# Patient Record
Sex: FEMALE | Race: WHITE | Employment: UNEMPLOYED | URBAN - NONMETROPOLITAN AREA
[De-identification: names, ages, dates, MRNs, and addresses within clinical notes are randomized per-mention and may not be internally consistent; named-entity substitution may affect disease eponyms.]

---

## 2017-05-16 PROBLEM — R00.2 PALPITATIONS: Status: ACTIVE | Noted: 2017-05-16

## 2017-07-13 PROBLEM — R73.03 PREDIABETES: Status: ACTIVE | Noted: 2017-07-13

## 2019-01-23 PROBLEM — Z80.3 FAMILY HISTORY OF MALIGNANT NEOPLASM OF BREAST: Status: ACTIVE | Noted: 2018-06-20

## 2019-01-23 PROBLEM — M19.90 OSTEOARTHROSIS: Status: ACTIVE | Noted: 2018-06-20

## 2019-01-23 PROBLEM — Z01.419 NORMAL PELVIC EXAM: Status: ACTIVE | Noted: 2019-01-23

## 2020-03-24 PROBLEM — N89.8 VAGINAL DISCHARGE: Status: RESOLVED | Noted: 2019-01-23 | Resolved: 2020-03-24

## 2020-05-22 PROBLEM — H90.3 SENSORINEURAL HEARING LOSS, BILATERAL: Status: ACTIVE | Noted: 2020-05-22

## 2021-03-10 DIAGNOSIS — R71.0 DECREASED HEMOGLOBIN: ICD-10-CM

## 2021-03-10 LAB
FERRITIN: 236.9 NG/ML (ref 15–150)
IRON SATURATION: 20 % (ref 15–50)
IRON: 50 UG/DL (ref 37–145)
TOTAL IRON BINDING CAPACITY: 252 UG/DL (ref 260–445)

## 2022-08-18 PROBLEM — E11.69 DIABETES MELLITUS TYPE 2 IN OBESE (HCC): Status: ACTIVE | Noted: 2022-08-18

## 2022-09-17 PROBLEM — Z01.818 PREOPERATIVE CLEARANCE: Status: RESOLVED | Noted: 2022-08-18 | Resolved: 2022-09-17

## 2023-07-31 ENCOUNTER — TELEMEDICINE (OUTPATIENT)
Dept: FAMILY MEDICINE CLINIC | Age: 58
End: 2023-07-31
Payer: COMMERCIAL

## 2023-07-31 DIAGNOSIS — K21.9 GASTROESOPHAGEAL REFLUX DISEASE, UNSPECIFIED WHETHER ESOPHAGITIS PRESENT: ICD-10-CM

## 2023-07-31 DIAGNOSIS — R10.84 GENERALIZED ABDOMINAL PAIN: Primary | ICD-10-CM

## 2023-07-31 PROCEDURE — 99214 OFFICE O/P EST MOD 30 MIN: CPT

## 2023-07-31 RX ORDER — PANTOPRAZOLE SODIUM 20 MG/1
20 TABLET, DELAYED RELEASE ORAL 2 TIMES DAILY
Qty: 60 TABLET | Refills: 3 | Status: SHIPPED | OUTPATIENT
Start: 2023-07-31 | End: 2023-11-28

## 2023-07-31 ASSESSMENT — ENCOUNTER SYMPTOMS
CONSTIPATION: 0
ABDOMINAL PAIN: 1
RECTAL PAIN: 0
BLOOD IN STOOL: 0
NAUSEA: 1
RESPIRATORY NEGATIVE: 1
ANAL BLEEDING: 0
DIARRHEA: 0
VOMITING: 0
ABDOMINAL DISTENTION: 1

## 2023-07-31 NOTE — PROGRESS NOTES
established patient. Services were provided through a video synchronous discussion virtually to substitute for in-person clinic visit.

## 2023-08-09 RX ORDER — SEMAGLUTIDE 0.68 MG/ML
INJECTION, SOLUTION SUBCUTANEOUS
Qty: 3 ML | Refills: 5 | Status: SHIPPED | OUTPATIENT
Start: 2023-08-09

## 2023-08-30 DIAGNOSIS — R10.84 GENERALIZED ABDOMINAL PAIN: Primary | ICD-10-CM

## 2023-09-01 DIAGNOSIS — I10 ESSENTIAL HYPERTENSION: ICD-10-CM

## 2023-09-01 RX ORDER — AMLODIPINE BESYLATE 5 MG/1
5 TABLET ORAL DAILY
Qty: 30 TABLET | Refills: 2 | Status: SHIPPED | OUTPATIENT
Start: 2023-09-01

## 2023-09-01 RX ORDER — RIVAROXABAN 20 MG/1
TABLET, FILM COATED ORAL
Qty: 30 TABLET | Refills: 2 | Status: SHIPPED | OUTPATIENT
Start: 2023-09-01

## 2023-10-05 ENCOUNTER — OUTSIDE SERVICES (OUTPATIENT)
Dept: SURGERY | Age: 58
End: 2023-10-05
Payer: MEDICARE

## 2023-10-05 DIAGNOSIS — R10.30 LOWER ABDOMINAL PAIN: Primary | ICD-10-CM

## 2023-10-05 PROCEDURE — 99202 OFFICE O/P NEW SF 15 MIN: CPT | Performed by: SURGERY

## 2023-10-24 ENCOUNTER — OFFICE VISIT (OUTPATIENT)
Dept: FAMILY MEDICINE CLINIC | Age: 58
End: 2023-10-24
Payer: MEDICARE

## 2023-10-24 VITALS
SYSTOLIC BLOOD PRESSURE: 120 MMHG | WEIGHT: 239 LBS | HEART RATE: 51 BPM | HEIGHT: 68 IN | OXYGEN SATURATION: 96 % | DIASTOLIC BLOOD PRESSURE: 78 MMHG | BODY MASS INDEX: 36.22 KG/M2

## 2023-10-24 DIAGNOSIS — I10 ESSENTIAL HYPERTENSION: ICD-10-CM

## 2023-10-24 DIAGNOSIS — I48.0 PAROXYSMAL ATRIAL FIBRILLATION (HCC): ICD-10-CM

## 2023-10-24 DIAGNOSIS — E78.2 MIXED HYPERLIPIDEMIA: ICD-10-CM

## 2023-10-24 DIAGNOSIS — R53.83 FATIGUE, UNSPECIFIED TYPE: ICD-10-CM

## 2023-10-24 DIAGNOSIS — F32.0 CURRENT MILD EPISODE OF MAJOR DEPRESSIVE DISORDER WITHOUT PRIOR EPISODE (HCC): ICD-10-CM

## 2023-10-24 DIAGNOSIS — E11.9 TYPE 2 DIABETES MELLITUS WITHOUT COMPLICATION, WITHOUT LONG-TERM CURRENT USE OF INSULIN (HCC): ICD-10-CM

## 2023-10-24 DIAGNOSIS — Z00.00 INITIAL MEDICARE ANNUAL WELLNESS VISIT: Primary | ICD-10-CM

## 2023-10-24 DIAGNOSIS — E03.9 HYPOTHYROIDISM, UNSPECIFIED TYPE: ICD-10-CM

## 2023-10-24 LAB
25(OH)D3 SERPL-MCNC: 34.1 NG/ML
ALBUMIN SERPL-MCNC: 4.6 G/DL (ref 3.4–5)
ALBUMIN/GLOB SERPL: 1.9 {RATIO} (ref 1.1–2.2)
ALP SERPL-CCNC: 136 U/L (ref 40–129)
ALT SERPL-CCNC: 11 U/L (ref 10–40)
ANION GAP SERPL CALCULATED.3IONS-SCNC: 9 MMOL/L (ref 3–16)
AST SERPL-CCNC: 16 U/L (ref 15–37)
BASOPHILS # BLD: 0 K/UL (ref 0–0.2)
BASOPHILS NFR BLD: 0.8 %
BILIRUB SERPL-MCNC: 0.8 MG/DL (ref 0–1)
BUN SERPL-MCNC: 14 MG/DL (ref 7–20)
CALCIUM SERPL-MCNC: 9.3 MG/DL (ref 8.3–10.6)
CHLORIDE SERPL-SCNC: 99 MMOL/L (ref 99–110)
CHOLEST SERPL-MCNC: 144 MG/DL (ref 0–199)
CO2 SERPL-SCNC: 29 MMOL/L (ref 21–32)
CREAT SERPL-MCNC: 1 MG/DL (ref 0.6–1.1)
DEPRECATED RDW RBC AUTO: 13.5 % (ref 12.4–15.4)
EOSINOPHIL # BLD: 0.1 K/UL (ref 0–0.6)
EOSINOPHIL NFR BLD: 2.1 %
FOLATE SERPL-MCNC: 10 NG/ML (ref 4.78–24.2)
GFR SERPLBLD CREATININE-BSD FMLA CKD-EPI: >60 ML/MIN/{1.73_M2}
GLUCOSE SERPL-MCNC: 143 MG/DL (ref 70–99)
HCT VFR BLD AUTO: 39 % (ref 36–48)
HDLC SERPL-MCNC: 33 MG/DL (ref 40–60)
HGB BLD-MCNC: 13 G/DL (ref 12–16)
LDLC SERPL CALC-MCNC: ABNORMAL MG/DL
LYMPHOCYTES # BLD: 1.2 K/UL (ref 1–5.1)
LYMPHOCYTES NFR BLD: 19.2 %
MCH RBC QN AUTO: 29.9 PG (ref 26–34)
MCHC RBC AUTO-ENTMCNC: 33.4 G/DL (ref 31–36)
MCV RBC AUTO: 89.4 FL (ref 80–100)
MONOCYTES # BLD: 0.4 K/UL (ref 0–1.3)
MONOCYTES NFR BLD: 6.9 %
NEUTROPHILS # BLD: 4.3 K/UL (ref 1.7–7.7)
NEUTROPHILS NFR BLD: 71 %
PLATELET # BLD AUTO: 387 K/UL (ref 135–450)
PMV BLD AUTO: 8.5 FL (ref 5–10.5)
POTASSIUM SERPL-SCNC: 4.8 MMOL/L (ref 3.5–5.1)
PROT SERPL-MCNC: 7 G/DL (ref 6.4–8.2)
RBC # BLD AUTO: 4.36 M/UL (ref 4–5.2)
SODIUM SERPL-SCNC: 137 MMOL/L (ref 136–145)
TRIGL SERPL-MCNC: 385 MG/DL (ref 0–150)
VIT B12 SERPL-MCNC: 245 PG/ML (ref 211–911)
VLDLC SERPL CALC-MCNC: ABNORMAL MG/DL
WBC # BLD AUTO: 6 K/UL (ref 4–11)

## 2023-10-24 PROCEDURE — 3051F HG A1C>EQUAL 7.0%<8.0%: CPT

## 2023-10-24 PROCEDURE — 3078F DIAST BP <80 MM HG: CPT

## 2023-10-24 PROCEDURE — 36415 COLL VENOUS BLD VENIPUNCTURE: CPT

## 2023-10-24 PROCEDURE — 3074F SYST BP LT 130 MM HG: CPT

## 2023-10-24 PROCEDURE — G0438 PPPS, INITIAL VISIT: HCPCS

## 2023-10-24 PROCEDURE — 99215 OFFICE O/P EST HI 40 MIN: CPT

## 2023-10-24 RX ORDER — DULOXETINE 40 MG/1
40 CAPSULE, DELAYED RELEASE ORAL 2 TIMES DAILY
Qty: 60 CAPSULE | Refills: 3 | Status: SHIPPED | OUTPATIENT
Start: 2023-10-24

## 2023-10-24 ASSESSMENT — PATIENT HEALTH QUESTIONNAIRE - PHQ9
4. FEELING TIRED OR HAVING LITTLE ENERGY: 3
SUM OF ALL RESPONSES TO PHQ9 QUESTIONS 1 & 2: 6
6. FEELING BAD ABOUT YOURSELF - OR THAT YOU ARE A FAILURE OR HAVE LET YOURSELF OR YOUR FAMILY DOWN: 1
8. MOVING OR SPEAKING SO SLOWLY THAT OTHER PEOPLE COULD HAVE NOTICED. OR THE OPPOSITE, BEING SO FIGETY OR RESTLESS THAT YOU HAVE BEEN MOVING AROUND A LOT MORE THAN USUAL: 1
1. LITTLE INTEREST OR PLEASURE IN DOING THINGS: 3
SUM OF ALL RESPONSES TO PHQ QUESTIONS 1-9: 18
SUM OF ALL RESPONSES TO PHQ QUESTIONS 1-9: 18
2. FEELING DOWN, DEPRESSED OR HOPELESS: 3
7. TROUBLE CONCENTRATING ON THINGS, SUCH AS READING THE NEWSPAPER OR WATCHING TELEVISION: 1
SUM OF ALL RESPONSES TO PHQ QUESTIONS 1-9: 18
9. THOUGHTS THAT YOU WOULD BE BETTER OFF DEAD, OR OF HURTING YOURSELF: 0
5. POOR APPETITE OR OVEREATING: 3
10. IF YOU CHECKED OFF ANY PROBLEMS, HOW DIFFICULT HAVE THESE PROBLEMS MADE IT FOR YOU TO DO YOUR WORK, TAKE CARE OF THINGS AT HOME, OR GET ALONG WITH OTHER PEOPLE: 1
3. TROUBLE FALLING OR STAYING ASLEEP: 3
SUM OF ALL RESPONSES TO PHQ QUESTIONS 1-9: 18

## 2023-10-24 ASSESSMENT — LIFESTYLE VARIABLES
HOW MANY STANDARD DRINKS CONTAINING ALCOHOL DO YOU HAVE ON A TYPICAL DAY: PATIENT DOES NOT DRINK
HOW OFTEN DO YOU HAVE A DRINK CONTAINING ALCOHOL: NEVER

## 2023-10-24 ASSESSMENT — COLUMBIA-SUICIDE SEVERITY RATING SCALE - C-SSRS
5. HAVE YOU STARTED TO WORK OUT OR WORKED OUT THE DETAILS OF HOW TO KILL YOURSELF? DO YOU INTEND TO CARRY OUT THIS PLAN?: NO
1. WITHIN THE PAST MONTH, HAVE YOU WISHED YOU WERE DEAD OR WISHED YOU COULD GO TO SLEEP AND NOT WAKE UP?: NO
7. DID THIS OCCUR IN THE LAST THREE MONTHS: NO
3. HAVE YOU BEEN THINKING ABOUT HOW YOU MIGHT KILL YOURSELF?: NO
4. HAVE YOU HAD THESE THOUGHTS AND HAD SOME INTENTION OF ACTING ON THEM?: NO
6. HAVE YOU EVER DONE ANYTHING, STARTED TO DO ANYTHING, OR PREPARED TO DO ANYTHING TO END YOUR LIFE?: NO
2. HAVE YOU ACTUALLY HAD ANY THOUGHTS OF KILLING YOURSELF?: NO
BASED ON RESPONSES TO C-SSRS QS 1-6, WHAT IS THE PATIENT'S OVERALL RISK RATING FOR SUICIDE: NO RISK

## 2023-10-24 NOTE — PROGRESS NOTES
ordered. -     Comprehensive Metabolic Panel  -     LIPID PANEL  Hypothyroidism, unspecified type  Repeat labs ordered today. Patient not currently on thyroid medication. Could be playing a factor in the patient's fatigue symptoms.  -     Comprehensive Metabolic Panel  -     TSH  -     T4, Free  Recommendations for Preventive Services Due: see orders and patient instructions/AVS.  Recommended screening schedule for the next 5-10 years is provided to the patient in written form: see Patient Instructions/AVS.     No follow-ups on file. Subjective   The following acute and/or chronic problems were also addressed today:  Patient seen today for Medicare annual wellness visit/routine office visit. Care gaps addressed today. Diabetes: Taking metformin 500 mg twice daily, Ozempic    Hypothyroid: not on meds at this time. Hyperlipidemia: Taking Lipitor 80 mg daily. Hypertension/A-fib: Taking metoprolol, Norvasc, spironolactone, bisoprolol. Is on Xarelto. Follows cardiology CNP Rodney Jasso and Dr. Coy Smallwood. Ongoing issues with depression. Still struggling with the loss of her mother in December of last year. Prior medication treatment plan effective but feels like she could use an adjustment if possible today. Patient's complete Health Risk Assessment and screening values have been reviewed and are found in Flowsheets. The following problems were reviewed today and where indicated follow up appointments were made and/or referrals ordered.     Positive Risk Factor Screenings with Interventions:        Depression:  PHQ-2 Score: 6  PHQ-9 Total Score: 6    Interpretation:   1-4 = minimal  5-9 = mild  10-14 = moderate  15-19 = moderately severe  20-27 = severe          General HRA Questions:  Select all that apply: (!) New or Increased Fatigue   Weight and Activity:  Physical Activity: Inactive (10/24/2023)    Exercise Vital Sign     Days of Exercise per Week: 0 days     Minutes of Exercise per Session: 0

## 2023-10-25 DIAGNOSIS — E78.2 MIXED HYPERLIPIDEMIA: Primary | ICD-10-CM

## 2023-10-25 LAB
EST. AVERAGE GLUCOSE BLD GHB EST-MCNC: 148.5 MG/DL
HBA1C MFR BLD: 6.8 %
LDLC SERPL-MCNC: 53 MG/DL
T4 FREE SERPL-MCNC: 1.1 NG/DL (ref 0.9–1.8)
TSH SERPL DL<=0.005 MIU/L-ACNC: 2.76 UIU/ML (ref 0.27–4.2)

## 2023-10-25 RX ORDER — FENOFIBRATE 48 MG/1
48 TABLET, COATED ORAL DAILY
Qty: 30 TABLET | Refills: 3 | Status: SHIPPED | OUTPATIENT
Start: 2023-10-25

## 2023-11-07 DIAGNOSIS — K21.9 GASTROESOPHAGEAL REFLUX DISEASE, UNSPECIFIED WHETHER ESOPHAGITIS PRESENT: ICD-10-CM

## 2023-11-07 RX ORDER — PANTOPRAZOLE SODIUM 20 MG/1
20 TABLET, DELAYED RELEASE ORAL 2 TIMES DAILY
Qty: 180 TABLET | Refills: 0 | Status: SHIPPED | OUTPATIENT
Start: 2023-11-07 | End: 2024-02-05

## 2023-11-21 DIAGNOSIS — I10 ESSENTIAL HYPERTENSION: ICD-10-CM

## 2023-11-21 RX ORDER — LISINOPRIL 40 MG/1
40 TABLET ORAL DAILY
Qty: 90 TABLET | Refills: 3 | Status: SHIPPED | OUTPATIENT
Start: 2023-11-21

## 2023-11-27 DIAGNOSIS — I10 ESSENTIAL HYPERTENSION: ICD-10-CM

## 2023-11-27 RX ORDER — AMLODIPINE BESYLATE 5 MG/1
5 TABLET ORAL DAILY
Qty: 30 TABLET | Refills: 5 | Status: SHIPPED | OUTPATIENT
Start: 2023-11-27

## 2023-12-14 NOTE — PROGRESS NOTES
Outside service performed at [facility] with date of service 10/5/2023. Clinic note located in the media section.

## 2024-01-05 RX ORDER — RIVAROXABAN 20 MG/1
TABLET, FILM COATED ORAL
Qty: 30 TABLET | Refills: 1 | Status: SHIPPED | OUTPATIENT
Start: 2024-01-05

## 2024-01-29 RX ORDER — HYDROXYZINE HYDROCHLORIDE 25 MG/1
25 TABLET, FILM COATED ORAL 2 TIMES DAILY PRN
Qty: 60 TABLET | Refills: 0 | Status: SHIPPED | OUTPATIENT
Start: 2024-01-29

## 2024-01-30 ENCOUNTER — TELEPHONE (OUTPATIENT)
Dept: FAMILY MEDICINE CLINIC | Age: 59
End: 2024-01-30

## 2024-01-30 DIAGNOSIS — F41.9 ANXIETY: Primary | ICD-10-CM

## 2024-01-30 RX ORDER — LORAZEPAM 1 MG/1
1 TABLET ORAL EVERY 8 HOURS PRN
Qty: 1 TABLET | Refills: 0 | Status: SHIPPED | OUTPATIENT
Start: 2024-01-30 | End: 2024-02-29

## 2024-01-30 NOTE — TELEPHONE ENCOUNTER
Has a MRI Thursday and was wondering if you would give her a couple pills to get her through the MRI. Due to her anxiety.

## 2024-02-01 ENCOUNTER — HOSPITAL ENCOUNTER (OUTPATIENT)
Dept: MRI IMAGING | Age: 59
Discharge: HOME | End: 2024-02-01
Payer: COMMERCIAL

## 2024-02-01 DIAGNOSIS — M48.062 SPINAL STENOSIS, LUMBAR REGION, WITH NEUROGENIC CLAUDICATION: ICD-10-CM

## 2024-02-01 PROCEDURE — 72148 MRI LUMBAR SPINE W/O DYE: CPT

## 2024-02-06 ENCOUNTER — OFFICE VISIT (OUTPATIENT)
Dept: FAMILY MEDICINE CLINIC | Age: 59
End: 2024-02-06
Payer: COMMERCIAL

## 2024-02-06 VITALS
DIASTOLIC BLOOD PRESSURE: 78 MMHG | OXYGEN SATURATION: 95 % | HEART RATE: 80 BPM | SYSTOLIC BLOOD PRESSURE: 132 MMHG | BODY MASS INDEX: 35.58 KG/M2 | WEIGHT: 234 LBS | TEMPERATURE: 97 F

## 2024-02-06 DIAGNOSIS — R68.89 FLU-LIKE SYMPTOMS: Primary | ICD-10-CM

## 2024-02-06 DIAGNOSIS — F32.0 CURRENT MILD EPISODE OF MAJOR DEPRESSIVE DISORDER WITHOUT PRIOR EPISODE (HCC): ICD-10-CM

## 2024-02-06 DIAGNOSIS — E66.9 DIABETES MELLITUS TYPE 2 IN OBESE (HCC): ICD-10-CM

## 2024-02-06 DIAGNOSIS — E11.69 DIABETES MELLITUS TYPE 2 IN OBESE (HCC): ICD-10-CM

## 2024-02-06 DIAGNOSIS — E03.9 HYPOTHYROIDISM, UNSPECIFIED TYPE: ICD-10-CM

## 2024-02-06 DIAGNOSIS — I10 PRIMARY HYPERTENSION: ICD-10-CM

## 2024-02-06 DIAGNOSIS — E78.2 MIXED HYPERLIPIDEMIA: ICD-10-CM

## 2024-02-06 DIAGNOSIS — I48.0 PAROXYSMAL ATRIAL FIBRILLATION (HCC): ICD-10-CM

## 2024-02-06 PROCEDURE — 3075F SYST BP GE 130 - 139MM HG: CPT

## 2024-02-06 PROCEDURE — 3078F DIAST BP <80 MM HG: CPT

## 2024-02-06 PROCEDURE — G2211 COMPLEX E/M VISIT ADD ON: HCPCS

## 2024-02-06 PROCEDURE — 99215 OFFICE O/P EST HI 40 MIN: CPT

## 2024-02-06 RX ORDER — SEMAGLUTIDE 0.68 MG/ML
INJECTION, SOLUTION SUBCUTANEOUS
Qty: 3 ML | Refills: 5 | Status: CANCELLED | OUTPATIENT
Start: 2024-02-06

## 2024-02-06 ASSESSMENT — ENCOUNTER SYMPTOMS
VOMITING: 1
NAUSEA: 1
COUGH: 1
SORE THROAT: 1

## 2024-02-06 NOTE — PROGRESS NOTES
Chief Complaint   Patient presents with    Sore Throat     Cough, chills, vomiting, nausea,fatigue  x 1 week     Back Pain    Dizziness       HPI:  Ashli Carty is a 58 y.o. (: 1965) here today   for x1 week fatigue, sore throat, cough, chills, vomiting/diarrhea, nausea. Has felt dizzy. Back has been hurting more since illness. Has not ate since 11 lastnight. Appetite has been poor last week. Cough dry.     Depression: taking Hydroxyzine 25mg BID PRN, ativan 1mg PRN, Prozac 40mg BID. Feels would benefit from talking to someone. Still struggling with loss of her mother. Life has never been the same.     Patient's medications, allergies, past medical, surgical, social and family histories were reviewed and updated asappropriate.    ROS:  Review of Systems   Constitutional:  Positive for chills, diaphoresis, fatigue and fever.   HENT:  Positive for congestion and sore throat.    Respiratory:  Positive for cough.    Cardiovascular: Negative.    Gastrointestinal:  Positive for nausea and vomiting.   Genitourinary: Negative.    Musculoskeletal:  Positive for myalgias.   Neurological:  Positive for dizziness and light-headedness.   Psychiatric/Behavioral:  The patient is nervous/anxious.            Prior to Visit Medications    Medication Sig Taking? Authorizing Provider   LORazepam (ATIVAN) 1 MG tablet Take 1 tablet by mouth every 8 hours as needed for Anxiety (take 30 minutes prior to MRI.) for up to 30 days. Max Daily Amount: 3 mg Yes Deo Polanco, APRN - CNP   hydrOXYzine HCl (ATARAX) 25 MG tablet TAKE 1 TABLET BY MOUTH 2 TIMES DAILY AS NEEDED FOR ANXIETY Yes Deo Polanco, APRN - CNP   XARELTO 20 MG TABS tablet TAKE 1 TABLET BY MOUTH DAILY (WITH BREAKFAST) Yes Deo Polanco, APRN - CNP   amLODIPine (NORVASC) 5 MG tablet TAKE 1 TABLET BY MOUTH DAILY Yes Faisal Lau MD   lisinopril (PRINIVIL;ZESTRIL) 40 MG tablet Take 1 tablet by mouth daily Yes Deo Polanco, APRN - CNP   pantoprazole

## 2024-02-07 LAB
ANION GAP SERPL CALCULATED.3IONS-SCNC: 14 MMOL/L (ref 3–16)
BASOPHILS # BLD: 0 K/UL (ref 0–0.2)
BASOPHILS NFR BLD: 0.8 %
BUN SERPL-MCNC: 14 MG/DL (ref 7–20)
CALCIUM SERPL-MCNC: 9.7 MG/DL (ref 8.3–10.6)
CHLORIDE SERPL-SCNC: 100 MMOL/L (ref 99–110)
CHOLEST SERPL-MCNC: 123 MG/DL (ref 0–199)
CO2 SERPL-SCNC: 25 MMOL/L (ref 21–32)
CREAT SERPL-MCNC: 1 MG/DL (ref 0.6–1.1)
DEPRECATED RDW RBC AUTO: 13.1 % (ref 12.4–15.4)
EOSINOPHIL # BLD: 0.1 K/UL (ref 0–0.6)
EOSINOPHIL NFR BLD: 2.1 %
EST. AVERAGE GLUCOSE BLD GHB EST-MCNC: 154.2 MG/DL
GFR SERPLBLD CREATININE-BSD FMLA CKD-EPI: >60 ML/MIN/{1.73_M2}
GLUCOSE SERPL-MCNC: 127 MG/DL (ref 70–99)
HBA1C MFR BLD: 7 %
HCT VFR BLD AUTO: 37.1 % (ref 36–48)
HDLC SERPL-MCNC: 31 MG/DL (ref 40–60)
HGB BLD-MCNC: 12.9 G/DL (ref 12–16)
LDLC SERPL CALC-MCNC: 51 MG/DL
LYMPHOCYTES # BLD: 1.2 K/UL (ref 1–5.1)
LYMPHOCYTES NFR BLD: 21.5 %
MCH RBC QN AUTO: 30.4 PG (ref 26–34)
MCHC RBC AUTO-ENTMCNC: 34.8 G/DL (ref 31–36)
MCV RBC AUTO: 87.3 FL (ref 80–100)
MONOCYTES # BLD: 0.4 K/UL (ref 0–1.3)
MONOCYTES NFR BLD: 6.9 %
NEUTROPHILS # BLD: 4 K/UL (ref 1.7–7.7)
NEUTROPHILS NFR BLD: 68.7 %
PLATELET # BLD AUTO: 390 K/UL (ref 135–450)
PMV BLD AUTO: 8.6 FL (ref 5–10.5)
POTASSIUM SERPL-SCNC: 4.9 MMOL/L (ref 3.5–5.1)
RBC # BLD AUTO: 4.25 M/UL (ref 4–5.2)
SODIUM SERPL-SCNC: 139 MMOL/L (ref 136–145)
T4 FREE SERPL-MCNC: 1.3 NG/DL (ref 0.9–1.8)
TRIGL SERPL-MCNC: 207 MG/DL (ref 0–150)
TSH SERPL DL<=0.005 MIU/L-ACNC: 1.64 UIU/ML (ref 0.27–4.2)
VLDLC SERPL CALC-MCNC: 41 MG/DL
WBC # BLD AUTO: 5.8 K/UL (ref 4–11)

## 2024-02-22 DIAGNOSIS — E11.69 DIABETES MELLITUS TYPE 2 IN OBESE (HCC): Primary | ICD-10-CM

## 2024-02-22 DIAGNOSIS — E66.9 DIABETES MELLITUS TYPE 2 IN OBESE (HCC): Primary | ICD-10-CM

## 2024-02-22 RX ORDER — SEMAGLUTIDE 0.68 MG/ML
1 INJECTION, SOLUTION SUBCUTANEOUS WEEKLY
Qty: 3 ML | Refills: 5 | Status: SHIPPED | OUTPATIENT
Start: 2024-02-22

## 2024-02-28 DIAGNOSIS — K21.9 GASTROESOPHAGEAL REFLUX DISEASE, UNSPECIFIED WHETHER ESOPHAGITIS PRESENT: ICD-10-CM

## 2024-02-28 DIAGNOSIS — E11.9 TYPE 2 DIABETES MELLITUS WITHOUT COMPLICATION, WITHOUT LONG-TERM CURRENT USE OF INSULIN (HCC): ICD-10-CM

## 2024-02-28 RX ORDER — METOPROLOL SUCCINATE 25 MG/1
25 TABLET, EXTENDED RELEASE ORAL DAILY
Qty: 90 TABLET | Refills: 3 | Status: SHIPPED | OUTPATIENT
Start: 2024-02-28

## 2024-02-28 RX ORDER — PANTOPRAZOLE SODIUM 20 MG/1
20 TABLET, DELAYED RELEASE ORAL 2 TIMES DAILY
Qty: 180 TABLET | Refills: 3 | Status: SHIPPED | OUTPATIENT
Start: 2024-02-28 | End: 2025-02-22

## 2024-02-28 NOTE — TELEPHONE ENCOUNTER
[phone] (Mobile) NA- mail box is full and cannot accept messages at this time-NA unable to leave message       Future appt scheduled 0 appt scheduled   Return in about 4 months (around 2/24/2024),                     Last appt 02/06/2024      Last Written     metoprolol succinate (TOPROL XL) 25 MG extended release tablet   #90  3 RF     metFORMIN (GLUCOPHAGE) 1000 MG tablet   03/24/2023  #180  3 RF

## 2024-03-05 DIAGNOSIS — E11.9 TYPE 2 DIABETES MELLITUS WITHOUT COMPLICATION, WITHOUT LONG-TERM CURRENT USE OF INSULIN (HCC): ICD-10-CM

## 2024-03-05 RX ORDER — ATORVASTATIN CALCIUM 80 MG/1
TABLET, FILM COATED ORAL
Qty: 90 TABLET | Refills: 3 | Status: SHIPPED | OUTPATIENT
Start: 2024-03-05

## 2024-03-14 ENCOUNTER — TELEPHONE (OUTPATIENT)
Dept: ADMINISTRATIVE | Age: 59
End: 2024-03-14

## 2024-03-14 DIAGNOSIS — B37.31 VAGINAL YEAST INFECTION: Primary | ICD-10-CM

## 2024-03-14 RX ORDER — FLUCONAZOLE 150 MG/1
150 TABLET ORAL
Qty: 2 TABLET | Refills: 0 | Status: SHIPPED | OUTPATIENT
Start: 2024-03-14 | End: 2024-03-20

## 2024-03-14 NOTE — TELEPHONE ENCOUNTER
Submitted PA for Ozempic (1 MG/DOSE) 4MG/3ML pen-injectors  Via CMM Key: [id number] STATUS: PENDING.    Follow up done daily; if no decision with in three days we will refax.  If another three days goes by with no decision will call the insurance for status.

## 2024-03-14 NOTE — TELEPHONE ENCOUNTER
Darling called and said that since she was on and antibiotic she has now developed a yeast infection in her \"hoohaa\" her words not mine and would like something sent in the the pharmacy for it please.

## 2024-04-05 RX ORDER — RIVAROXABAN 20 MG/1
TABLET, FILM COATED ORAL
Qty: 90 TABLET | Refills: 3 | Status: SHIPPED | OUTPATIENT
Start: 2024-04-05

## 2024-04-08 DIAGNOSIS — I10 ESSENTIAL HYPERTENSION: ICD-10-CM

## 2024-04-08 RX ORDER — LISINOPRIL 40 MG/1
40 TABLET ORAL DAILY
Qty: 90 TABLET | Refills: 2 | Status: SHIPPED | OUTPATIENT
Start: 2024-04-08

## 2024-04-16 ENCOUNTER — OFFICE VISIT (OUTPATIENT)
Dept: FAMILY MEDICINE CLINIC | Age: 59
End: 2024-04-16
Payer: COMMERCIAL

## 2024-04-16 VITALS
OXYGEN SATURATION: 96 % | WEIGHT: 245 LBS | SYSTOLIC BLOOD PRESSURE: 124 MMHG | DIASTOLIC BLOOD PRESSURE: 72 MMHG | HEART RATE: 74 BPM | BODY MASS INDEX: 37.25 KG/M2
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
## 2024-04-16 DIAGNOSIS — S30.860A TICK BITE OF BACK, INITIAL ENCOUNTER: Primary | ICD-10-CM

## 2024-04-16 PROCEDURE — 99214 OFFICE O/P EST MOD 30 MIN: CPT

## 2024-04-16 PROCEDURE — 3074F SYST BP LT 130 MM HG: CPT

## 2024-04-16 PROCEDURE — 3078F DIAST BP <80 MM HG: CPT

## 2024-04-16 RX ORDER — DOXYCYCLINE HYCLATE 100 MG
100 TABLET ORAL 2 TIMES DAILY
Qty: 20 TABLET | Refills: 0 | Status: SHIPPED | OUTPATIENT
Start: 2024-04-16 | End: 2024-04-26

## 2024-04-16 SDOH — ECONOMIC STABILITY: FOOD INSECURITY: WITHIN THE PAST 12 MONTHS, YOU WORRIED THAT YOUR FOOD WOULD RUN OUT BEFORE YOU GOT MONEY TO BUY MORE.: NEVER TRUE

## 2024-04-16 SDOH — ECONOMIC STABILITY: FOOD INSECURITY: WITHIN THE PAST 12 MONTHS, THE FOOD YOU BOUGHT JUST DIDN'T LAST AND YOU DIDN'T HAVE MONEY TO GET MORE.: NEVER TRUE

## 2024-04-16 SDOH — ECONOMIC STABILITY: HOUSING INSECURITY
IN THE LAST 12 MONTHS, WAS THERE A TIME WHEN YOU DID NOT HAVE A STEADY PLACE TO SLEEP OR SLEPT IN A SHELTER (INCLUDING NOW)?: NO

## 2024-04-16 SDOH — ECONOMIC STABILITY: INCOME INSECURITY: HOW HARD IS IT FOR YOU TO PAY FOR THE VERY BASICS LIKE FOOD, HOUSING, MEDICAL CARE, AND HEATING?: HARD

## 2024-04-16 ASSESSMENT — PATIENT HEALTH QUESTIONNAIRE - PHQ9
1. LITTLE INTEREST OR PLEASURE IN DOING THINGS: SEVERAL DAYS
SUM OF ALL RESPONSES TO PHQ QUESTIONS 1-9: 10
10. IF YOU CHECKED OFF ANY PROBLEMS, HOW DIFFICULT HAVE THESE PROBLEMS MADE IT FOR YOU TO DO YOUR WORK, TAKE CARE OF THINGS AT HOME, OR GET ALONG WITH OTHER PEOPLE: SOMEWHAT DIFFICULT
SUM OF ALL RESPONSES TO PHQ QUESTIONS 1-9: 10
SUM OF ALL RESPONSES TO PHQ QUESTIONS 1-9: 10
9. THOUGHTS THAT YOU WOULD BE BETTER OFF DEAD, OR OF HURTING YOURSELF: NOT AT ALL
SUM OF ALL RESPONSES TO PHQ QUESTIONS 1-9: 10
7. TROUBLE CONCENTRATING ON THINGS, SUCH AS READING THE NEWSPAPER OR WATCHING TELEVISION: SEVERAL DAYS
6. FEELING BAD ABOUT YOURSELF - OR THAT YOU ARE A FAILURE OR HAVE LET YOURSELF OR YOUR FAMILY DOWN: SEVERAL DAYS
3. TROUBLE FALLING OR STAYING ASLEEP: MORE THAN HALF THE DAYS
4. FEELING TIRED OR HAVING LITTLE ENERGY: NEARLY EVERY DAY
SUM OF ALL RESPONSES TO PHQ9 QUESTIONS 1 & 2: 2
5. POOR APPETITE OR OVEREATING: NOT AT ALL
8. MOVING OR SPEAKING SO SLOWLY THAT OTHER PEOPLE COULD HAVE NOTICED. OR THE OPPOSITE, BEING SO FIGETY OR RESTLESS THAT YOU HAVE BEEN MOVING AROUND A LOT MORE THAN USUAL: SEVERAL DAYS
2. FEELING DOWN, DEPRESSED OR HOPELESS: SEVERAL DAYS

## 2024-04-16 ASSESSMENT — ENCOUNTER SYMPTOMS
ROS SKIN COMMENTS: TICK BITE
GASTROINTESTINAL NEGATIVE: 1
RESPIRATORY NEGATIVE: 1

## 2024-04-16 NOTE — PROGRESS NOTES
Chief Complaint   Patient presents with    Insect Bite     Tick bite on back        HPI:  Darling Carty is a 59 y.o. (: 1965) here today   for evaluation of tick bite.  Was bitten on the back roughly 6 weeks ago.  Still is red and itches.  No bull's-eye rash noted.      Patient's medications, allergies, past medical, surgical, social and family histories were reviewed and updated asappropriate.    ROS:  Review of Systems   Constitutional: Negative.    HENT: Negative.     Respiratory: Negative.     Cardiovascular: Negative.    Gastrointestinal: Negative.    Musculoskeletal: Negative.    Skin:         Tick bite   Neurological: Negative.            Prior to Visit Medications    Medication Sig Taking? Authorizing Provider   doxycycline hyclate (VIBRA-TABS) 100 MG tablet Take 1 tablet by mouth 2 times daily for 10 days Yes Deo Polanco APRN - CNP   lisinopril (PRINIVIL;ZESTRIL) 40 MG tablet TAKE 1 TABLET BY MOUTH DAILY Yes Deo Polanco APRN - CNP   XARELTO 20 MG TABS tablet TAKE 1 TABLET BY MOUTH IN THE MORNING WITH BREAKFAST Yes Deo Polanco APRN - CNP   atorvastatin (LIPITOR) 80 MG tablet TAKE 1 TABLET BY MOUTH EVERY DAY Yes Faisal Lau MD   metoprolol succinate (TOPROL XL) 25 MG extended release tablet TAKE 1 TABLET BY MOUTH DAILY Yes Deo Polanco APRN - CNP   pantoprazole (PROTONIX) 20 MG tablet TAKE 1 TABLET BY MOUTH IN THE MORNING AND AT BEDTIME Yes Deo Polanco APRN - CNP   metFORMIN (GLUCOPHAGE) 1000 MG tablet Take 0.5 tablets by mouth 2 times daily (with meals) Yes Deo Polanco APRN - CNP   Semaglutide,0.25 or 0.5MG/DOS, (OZEMPIC, 0.25 OR 0.5 MG/DOSE,) 2 MG/3ML SOPN Inject 1 mg into the skin Once a week at 5 PM Yes Deo Polanco APRN - CNP   hydrOXYzine HCl (ATARAX) 25 MG tablet TAKE 1 TABLET BY MOUTH 2 TIMES DAILY AS NEEDED FOR ANXIETY Yes Deo Polanco APRN - CNP   amLODIPine (NORVASC) 5 MG tablet TAKE 1 TABLET BY MOUTH DAILY Yes Faisal Lau MD

## 2024-05-06 ENCOUNTER — OFFICE VISIT (OUTPATIENT)
Dept: FAMILY MEDICINE CLINIC | Age: 59
End: 2024-05-06
Payer: COMMERCIAL

## 2024-05-06 VITALS
BODY MASS INDEX: 36.95 KG/M2 | TEMPERATURE: 97.2 F | WEIGHT: 243 LBS | OXYGEN SATURATION: 74 % | SYSTOLIC BLOOD PRESSURE: 112 MMHG | HEART RATE: 93 BPM | DIASTOLIC BLOOD PRESSURE: 64 MMHG

## 2024-05-06 DIAGNOSIS — S30.860S TICK BITE OF BACK, SEQUELA: Primary | ICD-10-CM
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
## 2024-05-06 PROCEDURE — 3074F SYST BP LT 130 MM HG: CPT

## 2024-05-06 PROCEDURE — 99214 OFFICE O/P EST MOD 30 MIN: CPT

## 2024-05-06 PROCEDURE — 3078F DIAST BP <80 MM HG: CPT

## 2024-05-06 RX ORDER — TRIAMCINOLONE ACETONIDE 0.25 MG/G
CREAM TOPICAL
Qty: 15 G | Refills: 0 | Status: SHIPPED | OUTPATIENT
Start: 2024-05-06

## 2024-05-06 RX ORDER — AMOXICILLIN AND CLAVULANATE POTASSIUM 875; 125 MG/1; MG/1
1 TABLET, FILM COATED ORAL 2 TIMES DAILY
Qty: 20 TABLET | Refills: 0 | Status: SHIPPED | OUTPATIENT
Start: 2024-05-06 | End: 2024-05-16

## 2024-05-06 ASSESSMENT — ENCOUNTER SYMPTOMS
ROS SKIN COMMENTS: TICK BITE
GASTROINTESTINAL NEGATIVE: 1
RESPIRATORY NEGATIVE: 1

## 2024-05-06 NOTE — PROGRESS NOTES
on file   Social Connections: Not on file   Intimate Partner Violence: Not on file   Housing Stability: Unknown (4/16/2024)    Housing Stability Vital Sign     Unable to Pay for Housing in the Last Year: Not on file     Number of Places Lived in the Last Year: Not on file     Unstable Housing in the Last Year: No       Prior to Visit Medications    Medication Sig Taking? Authorizing Provider   triamcinolone (KENALOG) 0.025 % cream Apply topically 2 times daily. Yes Deo Polanco APRN - CNP   amoxicillin-clavulanate (AUGMENTIN) 875-125 MG per tablet Take 1 tablet by mouth 2 times daily for 10 days Yes Deo Polanco APRN - CNP   lisinopril (PRINIVIL;ZESTRIL) 40 MG tablet TAKE 1 TABLET BY MOUTH DAILY Yes Deo Polanco APRN - CNP   XARELTO 20 MG TABS tablet TAKE 1 TABLET BY MOUTH IN THE MORNING WITH BREAKFAST Yes Deo Polanco APRN - CNP   atorvastatin (LIPITOR) 80 MG tablet TAKE 1 TABLET BY MOUTH EVERY DAY Yes Faisal Lau MD   metoprolol succinate (TOPROL XL) 25 MG extended release tablet TAKE 1 TABLET BY MOUTH DAILY Yes Deo Polanco APRN - CNP   pantoprazole (PROTONIX) 20 MG tablet TAKE 1 TABLET BY MOUTH IN THE MORNING AND AT BEDTIME Yes Deo Polanco APRN - CNP   metFORMIN (GLUCOPHAGE) 1000 MG tablet Take 0.5 tablets by mouth 2 times daily (with meals) Yes Deo Polanco APRN - CNP   Semaglutide,0.25 or 0.5MG/DOS, (OZEMPIC, 0.25 OR 0.5 MG/DOSE,) 2 MG/3ML SOPN Inject 1 mg into the skin Once a week at 5 PM Yes Deo Polanco APRN - CNP   hydrOXYzine HCl (ATARAX) 25 MG tablet TAKE 1 TABLET BY MOUTH 2 TIMES DAILY AS NEEDED FOR ANXIETY Yes Deo Polanco APRN - CNP   amLODIPine (NORVASC) 5 MG tablet TAKE 1 TABLET BY MOUTH DAILY Yes Faisal Lau MD   fenofibrate (TRICOR) 48 MG tablet Take 1 tablet by mouth daily Yes Deo Polanco APRN - CNP   DULoxetine 40 MG CPEP Take 40 mg by mouth 2 times daily Yes Deo Polanco APRN - CNP   FLUoxetine (PROZAC) 40 MG capsule

## 2024-05-08 ENCOUNTER — TELEMEDICINE (OUTPATIENT)
Dept: FAMILY MEDICINE CLINIC | Age: 59
End: 2024-05-08
Payer: COMMERCIAL

## 2024-05-08 DIAGNOSIS — E87.6 LOW SERUM POTASSIUM: ICD-10-CM

## 2024-05-08 DIAGNOSIS — H91.92 HEARING LOSS OF LEFT EAR, UNSPECIFIED HEARING LOSS TYPE: ICD-10-CM

## 2024-05-08 DIAGNOSIS — Z00.00 MEDICARE ANNUAL WELLNESS VISIT, SUBSEQUENT: Primary | ICD-10-CM

## 2024-05-08 PROCEDURE — G0439 PPPS, SUBSEQ VISIT: HCPCS

## 2024-05-08 PROCEDURE — 99497 ADVNCD CARE PLAN 30 MIN: CPT

## 2024-05-08 RX ORDER — POTASSIUM CHLORIDE 750 MG/1
30 TABLET, FILM COATED, EXTENDED RELEASE ORAL 2 TIMES DAILY
Qty: 270 TABLET | Refills: 2 | OUTPATIENT
Start: 2024-05-08

## 2024-05-08 RX ORDER — POTASSIUM CHLORIDE 750 MG/1
30 TABLET, FILM COATED, EXTENDED RELEASE ORAL 2 TIMES DAILY
Qty: 270 TABLET | Refills: 3 | Status: SHIPPED | OUTPATIENT
Start: 2024-05-08

## 2024-05-08 ASSESSMENT — PATIENT HEALTH QUESTIONNAIRE - PHQ9
3. TROUBLE FALLING OR STAYING ASLEEP: MORE THAN HALF THE DAYS
2. FEELING DOWN, DEPRESSED OR HOPELESS: SEVERAL DAYS
SUM OF ALL RESPONSES TO PHQ QUESTIONS 1-9: 11
SUM OF ALL RESPONSES TO PHQ QUESTIONS 1-9: 11
10. IF YOU CHECKED OFF ANY PROBLEMS, HOW DIFFICULT HAVE THESE PROBLEMS MADE IT FOR YOU TO DO YOUR WORK, TAKE CARE OF THINGS AT HOME, OR GET ALONG WITH OTHER PEOPLE: SOMEWHAT DIFFICULT
SUM OF ALL RESPONSES TO PHQ QUESTIONS 1-9: 11
SUM OF ALL RESPONSES TO PHQ QUESTIONS 1-9: 10
4. FEELING TIRED OR HAVING LITTLE ENERGY: NEARLY EVERY DAY
5. POOR APPETITE OR OVEREATING: NOT AT ALL
6. FEELING BAD ABOUT YOURSELF - OR THAT YOU ARE A FAILURE OR HAVE LET YOURSELF OR YOUR FAMILY DOWN: SEVERAL DAYS
8. MOVING OR SPEAKING SO SLOWLY THAT OTHER PEOPLE COULD HAVE NOTICED. OR THE OPPOSITE, BEING SO FIGETY OR RESTLESS THAT YOU HAVE BEEN MOVING AROUND A LOT MORE THAN USUAL: SEVERAL DAYS
1. LITTLE INTEREST OR PLEASURE IN DOING THINGS: SEVERAL DAYS
7. TROUBLE CONCENTRATING ON THINGS, SUCH AS READING THE NEWSPAPER OR WATCHING TELEVISION: SEVERAL DAYS
9. THOUGHTS THAT YOU WOULD BE BETTER OFF DEAD, OR OF HURTING YOURSELF: SEVERAL DAYS
SUM OF ALL RESPONSES TO PHQ9 QUESTIONS 1 & 2: 2

## 2024-05-08 ASSESSMENT — COLUMBIA-SUICIDE SEVERITY RATING SCALE - C-SSRS
6. HAVE YOU EVER DONE ANYTHING, STARTED TO DO ANYTHING, OR PREPARED TO DO ANYTHING TO END YOUR LIFE?: NO
1. WITHIN THE PAST MONTH, HAVE YOU WISHED YOU WERE DEAD OR WISHED YOU COULD GO TO SLEEP AND NOT WAKE UP?: NO
2. HAVE YOU ACTUALLY HAD ANY THOUGHTS OF KILLING YOURSELF?: NO

## 2024-05-08 NOTE — PATIENT INSTRUCTIONS
weight-loss support group.  If you're not ready to make changes right now, try to pick a date in the future. Then make an appointment with your doctor to talk about when and how you'll get started with a plan.  Follow-up care is a key part of your treatment and safety. Be sure to make and go to all appointments, and call your doctor if you are having problems. It's also a good idea to know your test results and keep a list of the medicines you take.  How can you care for yourself at home?  Set realistic goals. Many people expect to lose much more weight than is likely. A weight loss of 5% to 10% of your body weight may be enough to improve your health.  Get family and friends involved to provide support. Talk to them about why you are trying to lose weight, and ask them to help. They can help by participating in exercise and having meals with you, even if they may be eating something different.  Find what works best for you. If you do not have time or do not like to cook, a program that offers meal replacement bars or shakes may be better for you. Or if you like to prepare meals, finding a plan that includes daily menus and recipes may be best.  Ask your doctor about other health professionals who can help you achieve your weight loss goals.  A dietitian can help you make healthy changes in your diet.  An exercise specialist or  can help you develop a safe and effective exercise program.  A counselor or psychiatrist can help you cope with issues such as depression, anxiety, or family problems that can make it hard to focus on weight loss.  Consider joining a support group for people who are trying to lose weight. Your doctor can suggest groups in your area.  Where can you learn more?  Go to https://www.healthwise.net/patientEd and enter U357 to learn more about \"Starting a Weight Loss Plan: Care Instructions.\"  Current as of: September 20, 2023               Content Version: 14.0  © 2188-0307

## 2024-05-08 NOTE — PROGRESS NOTES
Faisal Lau MD   fenofibrate (TRICOR) 48 MG tablet Take 1 tablet by mouth daily Yes Deo Polanco APRN - CNP   DULoxetine 40 MG CPEP Take 40 mg by mouth 2 times daily Yes Deo Polanco APRN - CNP   FLUoxetine (PROZAC) 40 MG capsule TAKE 1 CAPSULE BY MOUTH 2 TIMES DAILY Yes Deo Polanco APRN - CNP   spironolactone (ALDACTONE) 25 MG tablet  Yes Mario Horta MD   blood glucose monitor strips 1 strip by Other route daily Yes Deo Polanco APRN - CNP   bisoprolol (ZEBETA) 5 MG tablet Take 1 tablet by mouth daily Yes Deo Polanco APRN - CNP   gabapentin (NEURONTIN) 800 MG tablet Take 1 tablet by mouth 3 times daily. Yes Mario Horta MD   glucose monitoring kit (FREESTYLE) monitoring kit 1 kit by Does not apply route daily Yes Vinnie Cespedes MD   FREESTYLE LANCETS MISC 1 each by Does not apply route daily Yes Vinnie Cespedes MD       [facility] (Including outside providers/suppliers regularly involved in providing care):   Patient Care Team:  Deo Polanco APRN - CNP as PCP - General (Certified Nurse Practitioner)  Deo Polanco APRN - CNP as PCP - Empaneled Provider  Ankit Arciniega MD as Consulting Physician (Gastroenterology)     Reviewed and updated this visit:  Tobacco  Allergies  Meds  Problems  Med Hx  Surg Hx  Soc Hx  Fam Hx           Darling MAXIMINO Carty, was evaluated through a synchronous (real-time) audio-video encounter. The patient (or guardian if applicable) is aware that this is a billable service, which includes applicable co-pays. This Virtual Visit was conducted with patient's (and/or legal guardian's) consent. Patient identification was verified, and a caregiver was present when appropriate.   The patient was located at Home: [address]  [address]  [facility] [address]  Provider was located at Facility (Appt Dept): [address] [place] [id number]  Confirm you are appropriately licensed, registered, or certified to deliver care in

## 2024-05-30 ENCOUNTER — OUTSIDE SERVICES (OUTPATIENT)
Dept: SURGERY | Age: 59
End: 2024-05-30
Payer: COMMERCIAL

## 2024-05-30 DIAGNOSIS — D48.5 NEOPLASM OF UNCERTAIN BEHAVIOR OF SKIN: Primary | ICD-10-CM

## 2024-05-30 PROCEDURE — 99213 OFFICE O/P EST LOW 20 MIN: CPT | Performed by: SURGERY

## 2024-06-03 DIAGNOSIS — I10 ESSENTIAL HYPERTENSION: ICD-10-CM

## 2024-06-03 RX ORDER — AMLODIPINE BESYLATE 5 MG/1
5 TABLET ORAL DAILY
Qty: 90 TABLET | Refills: 3 | Status: SHIPPED | OUTPATIENT
Start: 2024-06-03

## 2024-06-10 ENCOUNTER — TELEPHONE (OUTPATIENT)
Dept: SURGERY | Age: 59
End: 2024-06-10

## 2024-06-10 NOTE — TELEPHONE ENCOUNTER
Rosie from [facility] called. Patient is scheduled for Local excision on Thursday 6/13/24. She is on Xarelto. She is asking if she is ok to continue taking Xarelto, or do you want her to hold it?  Per Dr. Bragg, Hold Xarelto x 2 days. I called Rosie and informed her.

## 2024-06-13 ENCOUNTER — OUTSIDE SERVICES (OUTPATIENT)
Dept: SURGERY | Age: 59
End: 2024-06-13
Payer: COMMERCIAL

## 2024-06-13 DIAGNOSIS — R59.9 REACTIVE LYMPHOID HYPERPLASIA: Primary | ICD-10-CM

## 2024-06-13 PROCEDURE — 11404 EXC TR-EXT B9+MARG 3.1-4 CM: CPT | Performed by: SURGERY

## 2024-06-13 PROCEDURE — 12035 INTMD RPR S/A/T/EXT 12.6-20: CPT | Performed by: SURGERY

## 2024-06-13 PROCEDURE — 11403 EXC TR-EXT B9+MARG 2.1-3CM: CPT | Performed by: SURGERY

## 2024-06-25 NOTE — PROGRESS NOTES
Darling Carty   1965, 59 y.o. female   [id number]       Referring Provider: Christianne Simeon DO  Referral Type: In an order in Epic    Reason for Visit: Evaluation of suspected change in hearing, tinnitus, or balance.    ADULT AUDIOLOGIC EVALUATION      Darling Carty is a 59 y.o. female seen today, 6/26/2024 , for a recheck audiologic evaluation.  Patient was seen by Christianne Simeon DO following today's evaluation. Patient was alone.    AUDIOLOGIC AND OTHER PERTINENT MEDICAL HISTORY:      Darling Carty noted tinnitus and dizziness.  Patient reports perceived decline in hearing since previous audiologic evaluation on 05/22/2020.  She noted constant bilateral, left worse than right, tinnitus.  She has experienced a dizziness sensation triggered by getting up out of bed.    Darling Carty denied otalgia and aural fullness.    Date: 6/26/2024     IMPRESSIONS:      Normal middle ear pressure and compliance, bilaterally.  Abnormal hearing sensitivity, left worse than right, which can affect every day listening needs.  Word understanding was good in the left ear and excellent in the right ear presented at elevated sensation levels.  Slight decline in hearing, bilaterally, and word recognition in the left ear since previous audiologic evaluation.  Hearing aids are recommended at this time.  Follow medical recommendations of Christianne Simeon DO.     ASSESSMENT AND FINDINGS:     Otoscopy revealed: Clear ear canals bilaterally    RIGHT EAR:  Hearing Sensitivity: Mild to a moderately severe sensorineural hearing loss from 250-8000 Hz  Speech Recognition Threshold: 35 dB HL  Word Recognition:Excellent (100%), based on NU-6 25-word list at 70 dBHL using recorded speech stimuli.    Tympanometry: Normal peak pressure and compliance, Type A tympanogram, consistent with normal middle ear function.      LEFT EAR:  Hearing Sensitivity: Moderate to a moderately severe sensorineural hearing loss from 250-8000 Hz  Speech Recognition Threshold: 50

## 2024-06-26 ENCOUNTER — PROCEDURE VISIT (OUTPATIENT)
Dept: AUDIOLOGY | Age: 59
End: 2024-06-26
Payer: COMMERCIAL

## 2024-06-26 ENCOUNTER — TELEPHONE (OUTPATIENT)
Dept: SURGERY | Age: 59
End: 2024-06-26

## 2024-06-26 ENCOUNTER — OFFICE VISIT (OUTPATIENT)
Dept: ENT CLINIC | Age: 59
End: 2024-06-26
Payer: COMMERCIAL

## 2024-06-26 VITALS
WEIGHT: 243 LBS | HEIGHT: 68 IN | SYSTOLIC BLOOD PRESSURE: 133 MMHG | DIASTOLIC BLOOD PRESSURE: 71 MMHG | HEART RATE: 68 BPM | BODY MASS INDEX: 36.83 KG/M2

## 2024-06-26 DIAGNOSIS — H90.3 SENSORINEURAL HEARING LOSS (SNHL) OF BOTH EARS: Primary | ICD-10-CM

## 2024-06-26 DIAGNOSIS — H93.13 TINNITUS OF BOTH EARS: ICD-10-CM

## 2024-06-26 DIAGNOSIS — H90.3 ASYMMETRIC SNHL (SENSORINEURAL HEARING LOSS): ICD-10-CM

## 2024-06-26 DIAGNOSIS — R42 DIZZINESS AND GIDDINESS: ICD-10-CM

## 2024-06-26 DIAGNOSIS — H90.3 SENSORINEURAL HEARING LOSS, BILATERAL: Primary | ICD-10-CM

## 2024-06-26 PROCEDURE — 3075F SYST BP GE 130 - 139MM HG: CPT | Performed by: OTOLARYNGOLOGY

## 2024-06-26 PROCEDURE — 92567 TYMPANOMETRY: CPT | Performed by: AUDIOLOGIST

## 2024-06-26 PROCEDURE — 92557 COMPREHENSIVE HEARING TEST: CPT | Performed by: AUDIOLOGIST

## 2024-06-26 PROCEDURE — 3078F DIAST BP <80 MM HG: CPT | Performed by: OTOLARYNGOLOGY

## 2024-06-26 PROCEDURE — 99204 OFFICE O/P NEW MOD 45 MIN: CPT | Performed by: OTOLARYNGOLOGY

## 2024-06-26 ASSESSMENT — ENCOUNTER SYMPTOMS
COUGH: 0
SORE THROAT: 0
APNEA: 0
FACIAL SWELLING: 0
VOICE CHANGE: 0
SHORTNESS OF BREATH: 0
TROUBLE SWALLOWING: 0
SINUS PRESSURE: 0
EYE ITCHING: 0

## 2024-06-26 NOTE — PROGRESS NOTES
[facility]  [address], [address]  [place] [address]  P: [phone]       Patient     Darling Carty  1965    ChiefComplaint     Chief Complaint   Patient presents with    Hearing Problem       History of Present Illness     Darling is a 59-year-old female here today for evaluation of hearing loss.  Last seen in 2020 states hearing loss has progressed in the left ear.  Also reports constant tinnitus must avoid quiet due to this.  Denies otalgia, otorrhea, vertigo.  Reports chronic imbalance.    Past Medical History     Past Medical History:   Diagnosis Date    Anxiety     Atrial fibrillation (HCC)     Depression     CHEN (dyspnea on exertion) 03/16/2017    Essential hypertension 08/25/2015    Gestational diabetes     Hypertension     Hypertriglyceridemia 04/10/2017    Hypothyroidism     Normal pelvic exam 01/23/2019    Other screening mammogram 01/23/2019    Severe obesity (BMI 35.0-39.9) with comorbidity ([facility]) 07/13/2017    Snoring 07/13/2017    Type 2 diabetes mellitus without complication, without long-term current use of insulin ([facility])     Vaginal discharge 01/23/2019       Past Surgical History     Past Surgical History:   Procedure Laterality Date    CERVICAL SPINE SURGERY Bilateral 12/4/2018    BILATERAL LUMBAR THREE, LUMBAR FOUR, LUMBAR FIVE MEDIAL BRANCH BLOCK SITE CONFIRMED BY FLUOROSCOPY performed by AMY Roberson MD at [facility] OR    CERVICAL SPINE SURGERY Right 1/8/2019    RIGHT LUMBAR THREE, LUMBAR FOUR, LUMBAR FIVE MEDIAL BRANCH BLOCK SITE CONFIRMED BY FLUOROSOCPY performed by AMY Roberson MD at [facility] OR    COLONOSCOPY  09/08/2020    Hemorrhoids    COLONOSCOPY N/A 9/8/2020    COLON W/ANES. (8:00) performed by Ankit Arciniega MD at [facility]    HYSTERECTOMY (CERVIX STATUS UNKNOWN)      heavy bleeding;one ovary removed;    NERVE SURGERY Right 1/28/2019    RIGHT LUMBAR THREE, LUMBAR FOUR, LUMBAR FIVE RADIOFREQUENCY ABLATION SITE CONFIRMED BY

## 2024-06-26 NOTE — TELEPHONE ENCOUNTER
I called [facility] lab to see if pathology is back yet. They will fax results to our office.

## 2024-06-27 ENCOUNTER — HOSPITAL ENCOUNTER (OUTPATIENT)
Age: 59
Setting detail: SPECIMEN
Discharge: HOME | End: 2024-06-27

## 2024-07-02 NOTE — PROGRESS NOTES
[facility]    HPI:  Patient is 59 y.o. year old female seen at request of Deo Polanco APRN - CNP.  She presents because of lesion located on  upper back .  There has been pain at or near the lesion and non healing .  There is not  previous history of similar.  There has not been any biopsy.  She had a tick bite in same location in early March.  She has received antibiotics and still non healing.    Past Medical History:   Diagnosis Date    Anxiety     Atrial fibrillation (HCC)     Depression     CHEN (dyspnea on exertion) 03/16/2017    Essential hypertension 08/25/2015    Gestational diabetes     Hypertension     Hypertriglyceridemia 04/10/2017    Hypothyroidism     Normal pelvic exam 01/23/2019    Other screening mammogram 01/23/2019    Severe obesity (BMI 35.0-39.9) with comorbidity (HCC) 07/13/2017    Snoring 07/13/2017    Type 2 diabetes mellitus without complication, without long-term current use of insulin (HCC)     Vaginal discharge 01/23/2019       Past Surgical History:   Procedure Laterality Date    CERVICAL SPINE SURGERY Bilateral 12/4/2018    BILATERAL LUMBAR THREE, LUMBAR FOUR, LUMBAR FIVE MEDIAL BRANCH BLOCK SITE CONFIRMED BY FLUOROSCOPY performed by AMY Roberson MD at [facility] OR    CERVICAL SPINE SURGERY Right 1/8/2019    RIGHT LUMBAR THREE, LUMBAR FOUR, LUMBAR FIVE MEDIAL BRANCH BLOCK SITE CONFIRMED BY FLUOROSOCPY performed by AMY Roberson MD at [facility] OR    COLONOSCOPY  09/08/2020    Hemorrhoids    COLONOSCOPY N/A 9/8/2020    COLON W/ANES. (8:00) performed by Ankit Arciniega MD at [facility]    HYSTERECTOMY (CERVIX STATUS UNKNOWN)      heavy bleeding;one ovary removed;    NERVE SURGERY Right 1/28/2019    RIGHT LUMBAR THREE, LUMBAR FOUR, LUMBAR FIVE RADIOFREQUENCY ABLATION SITE CONFIRMED BY FLUOROSCOPY performed by AMY Roberson MD at [facility] OR    IL NJX DX/THER SBST INTRLMNR CRV/THRC W/IMG GDN Right 10/30/2018    RIGHT

## 2024-07-05 DIAGNOSIS — F41.9 ANXIETY: ICD-10-CM

## 2024-07-08 RX ORDER — FLUOXETINE HYDROCHLORIDE 40 MG/1
40 CAPSULE ORAL 2 TIMES DAILY
Qty: 180 CAPSULE | Refills: 2 | Status: SHIPPED | OUTPATIENT
Start: 2024-07-08

## 2024-07-09 ENCOUNTER — TELEPHONE (OUTPATIENT)
Dept: SURGERY | Age: 59
End: 2024-07-09

## 2024-07-09 LAB
Lab: NORMAL
REPORT: NORMAL
THIS TEST SENT TO: NORMAL

## 2024-07-09 NOTE — TELEPHONE ENCOUNTER
I called patient and informed her, and will call her back once we get the final supplemental report.

## 2024-07-09 NOTE — TELEPHONE ENCOUNTER
I received a call from [facility] pathology/histology dept. They informed me they are reviewing her samples and should have the results back to us within the next few days.

## 2024-07-09 NOTE — TELEPHONE ENCOUNTER
I called [facility]  to see if they have a supplemental pathology report for her, but they states they did not receive one yet. I then called [facility] and they checked with histology. Histology stated it was not completed yet, so they will look into it and let us know once it is complete.

## 2024-07-11 ENCOUNTER — OFFICE VISIT (OUTPATIENT)
Dept: FAMILY MEDICINE CLINIC | Age: 59
End: 2024-07-11
Payer: COMMERCIAL

## 2024-07-11 VITALS
DIASTOLIC BLOOD PRESSURE: 66 MMHG | WEIGHT: 241.2 LBS | HEIGHT: 69 IN | BODY MASS INDEX: 35.73 KG/M2 | SYSTOLIC BLOOD PRESSURE: 123 MMHG | HEART RATE: 75 BPM | OXYGEN SATURATION: 98 %

## 2024-07-11 DIAGNOSIS — I10 ESSENTIAL HYPERTENSION: ICD-10-CM

## 2024-07-11 DIAGNOSIS — R42 DIZZINESS: Primary | ICD-10-CM

## 2024-07-11 DIAGNOSIS — E11.9 TYPE 2 DIABETES MELLITUS WITHOUT COMPLICATION, WITHOUT LONG-TERM CURRENT USE OF INSULIN (HCC): ICD-10-CM

## 2024-07-11 DIAGNOSIS — E03.9 HYPOTHYROIDISM, UNSPECIFIED TYPE: ICD-10-CM

## 2024-07-11 DIAGNOSIS — E78.2 MIXED HYPERLIPIDEMIA: ICD-10-CM

## 2024-07-11 DIAGNOSIS — I48.0 PAROXYSMAL ATRIAL FIBRILLATION (HCC): ICD-10-CM

## 2024-07-11 LAB
ALBUMIN SERPL-MCNC: 4.3 G/DL (ref 3.4–5)
ALBUMIN/GLOB SERPL: 1.7 {RATIO} (ref 1.1–2.2)
ALP SERPL-CCNC: 145 U/L (ref 40–129)
ALT SERPL-CCNC: 16 U/L (ref 10–40)
ANION GAP SERPL CALCULATED.3IONS-SCNC: 11 MMOL/L (ref 3–16)
AST SERPL-CCNC: 14 U/L (ref 15–37)
BASOPHILS # BLD: 0 K/UL (ref 0–0.2)
BASOPHILS NFR BLD: 0.6 %
BILIRUB SERPL-MCNC: 0.6 MG/DL (ref 0–1)
BUN SERPL-MCNC: 12 MG/DL (ref 7–20)
CALCIUM SERPL-MCNC: 9.6 MG/DL (ref 8.3–10.6)
CHLORIDE SERPL-SCNC: 100 MMOL/L (ref 99–110)
CHOLEST SERPL-MCNC: 191 MG/DL (ref 0–199)
CO2 SERPL-SCNC: 27 MMOL/L (ref 21–32)
CREAT SERPL-MCNC: 0.9 MG/DL (ref 0.6–1.1)
DEPRECATED RDW RBC AUTO: 14.1 % (ref 12.4–15.4)
EOSINOPHIL # BLD: 0.1 K/UL (ref 0–0.6)
EOSINOPHIL NFR BLD: 1.9 %
EST. AVERAGE GLUCOSE BLD GHB EST-MCNC: 180 MG/DL
GFR SERPLBLD CREATININE-BSD FMLA CKD-EPI: 74 ML/MIN/{1.73_M2}
GLUCOSE SERPL-MCNC: 211 MG/DL (ref 70–99)
HBA1C MFR BLD: 7.9 %
HCT VFR BLD AUTO: 35.6 % (ref 36–48)
HDLC SERPL-MCNC: 40 MG/DL (ref 40–60)
HGB BLD-MCNC: 12 G/DL (ref 12–16)
LDLC SERPL CALC-MCNC: ABNORMAL MG/DL
LDLC SERPL-MCNC: 90 MG/DL
LYMPHOCYTES # BLD: 1.3 K/UL (ref 1–5.1)
LYMPHOCYTES NFR BLD: 24.3 %
MCH RBC QN AUTO: 29.7 PG (ref 26–34)
MCHC RBC AUTO-ENTMCNC: 33.7 G/DL (ref 31–36)
MCV RBC AUTO: 88.1 FL (ref 80–100)
MONOCYTES # BLD: 0.4 K/UL (ref 0–1.3)
MONOCYTES NFR BLD: 6.9 %
NEUTROPHILS # BLD: 3.6 K/UL (ref 1.7–7.7)
NEUTROPHILS NFR BLD: 66.3 %
PLATELET # BLD AUTO: 352 K/UL (ref 135–450)
PMV BLD AUTO: 8.4 FL (ref 5–10.5)
POTASSIUM SERPL-SCNC: 4.8 MMOL/L (ref 3.5–5.1)
PROT SERPL-MCNC: 6.8 G/DL (ref 6.4–8.2)
RBC # BLD AUTO: 4.04 M/UL (ref 4–5.2)
SODIUM SERPL-SCNC: 138 MMOL/L (ref 136–145)
T4 FREE SERPL-MCNC: 1 NG/DL (ref 0.9–1.8)
TRIGL SERPL-MCNC: 342 MG/DL (ref 0–150)
TSH SERPL DL<=0.005 MIU/L-ACNC: 1.07 UIU/ML (ref 0.27–4.2)
VLDLC SERPL CALC-MCNC: ABNORMAL MG/DL
WBC # BLD AUTO: 5.4 K/UL (ref 4–11)

## 2024-07-11 PROCEDURE — G2211 COMPLEX E/M VISIT ADD ON: HCPCS

## 2024-07-11 PROCEDURE — 99215 OFFICE O/P EST HI 40 MIN: CPT

## 2024-07-11 PROCEDURE — 3051F HG A1C>EQUAL 7.0%<8.0%: CPT

## 2024-07-11 PROCEDURE — 3078F DIAST BP <80 MM HG: CPT

## 2024-07-11 PROCEDURE — 3074F SYST BP LT 130 MM HG: CPT

## 2024-07-11 RX ORDER — MECLIZINE HYDROCHLORIDE 25 MG/1
25 TABLET ORAL 3 TIMES DAILY PRN
Qty: 30 TABLET | Refills: 0 | Status: SHIPPED | OUTPATIENT
Start: 2024-07-11 | End: 2024-07-21

## 2024-07-11 ASSESSMENT — ENCOUNTER SYMPTOMS: GASTROINTESTINAL NEGATIVE: 1

## 2024-07-11 NOTE — PROGRESS NOTES
name: Not on file    Number of children: Not on file    Years of education: Not on file    Highest education level: Not on file   Occupational History    Not on file   Tobacco Use    Smoking status: Never    Smokeless tobacco: Never   Vaping Use    Vaping Use: Never used   Substance and Sexual Activity    Alcohol use: No    Drug use: No    Sexual activity: Not Currently   Other Topics Concern    Not on file   Social History Narrative    11/2014 ,living with spouse and 2 children;works as housejeeper at [employer];     Social Determinants of Health     Financial Resource Strain: High Risk (4/16/2024)    Overall Financial Resource Strain (CARDIA)     Difficulty of Paying Living Expenses: Hard   Food Insecurity: No Food Insecurity (4/16/2024)    Hunger Vital Sign     Worried About Running Out of Food in the Last Year: Never true     Ran Out of Food in the Last Year: Never true   Transportation Needs: Unknown (4/16/2024)    PRAPARE - Transportation     Lack of Transportation (Medical): Not on file     Lack of Transportation (Non-Medical): No   Physical Activity: Inactive (5/8/2024)    Exercise Vital Sign     Days of Exercise per Week: 0 days     Minutes of Exercise per Session: 0 min   Stress: Not on file   Social Connections: Not on file   Intimate Partner Violence: Not on file   Housing Stability: Unknown (4/16/2024)    Housing Stability Vital Sign     Unable to Pay for Housing in the Last Year: Not on file     Number of Places Lived in the Last Year: Not on file     Unstable Housing in the Last Year: No       Prior to Visit Medications    Medication Sig Taking? Authorizing Provider   meclizine (ANTIVERT) 25 MG tablet Take 1 tablet by mouth 3 times daily as needed for Dizziness Yes Deo Polanco APRN - CNP   FLUoxetine (PROZAC) 40 MG capsule TAKE 1 CAPSULE BY MOUTH 2 TIMES DAILY  Deo Polanco APRN - CNP   amLODIPine (NORVASC) 5 MG tablet TAKE 1 TABLET BY MOUTH DAILY  Faisal Lau MD   potassium

## 2024-07-11 NOTE — RESULT ENCOUNTER NOTE
Glucose 211, otherwise increase in alkaline phosphatase.  Similar to prior.  Otherwise CMP is okay.  Lipids worse than last check with increase in total cholesterol, triglycerides.  LDL cholesterol increased as well.  Please confirm she is still taking atorvastatin as prescribed.  Thyroid labs normal.  Hemoglobin A1c pending

## 2024-07-12 NOTE — RESULT ENCOUNTER NOTE
If taking atorvastatin 80 mg daily, I would recommend adding Zetia 10 mg daily.  Hemoglobin A1c more elevated at 7.9.  Currently taking metformin at 1/2 tablet twice a day with meals.  Could she potentially increase the evening metformin to a whole tablet?  Another option would be to increase the Ozempic further.  If she is tolerating that well, and currently taking what appears to be 1 mg weekly, we could increase that to 2 mg.  Either would be an option

## 2024-07-13 ENCOUNTER — TELEPHONE (OUTPATIENT)
Dept: SURGERY | Age: 59
End: 2024-07-13

## 2024-07-13 NOTE — TELEPHONE ENCOUNTER
Tell her multiple people looked at the skin lesions, and they are benign.  No further treatment needed.  Follow up as needed.

## 2024-07-15 DIAGNOSIS — E11.9 TYPE 2 DIABETES MELLITUS WITHOUT COMPLICATION, WITHOUT LONG-TERM CURRENT USE OF INSULIN (HCC): ICD-10-CM

## 2024-07-15 DIAGNOSIS — E78.2 MIXED HYPERLIPIDEMIA: Primary | ICD-10-CM

## 2024-07-15 RX ORDER — EZETIMIBE 10 MG/1
10 TABLET ORAL DAILY
Qty: 90 TABLET | Refills: 1 | Status: SHIPPED | OUTPATIENT
Start: 2024-07-15

## 2024-07-19 ENCOUNTER — PROCEDURE VISIT (OUTPATIENT)
Dept: AUDIOLOGY | Age: 59
End: 2024-07-19

## 2024-07-19 DIAGNOSIS — H90.3 SENSORINEURAL HEARING LOSS, BILATERAL: Primary | ICD-10-CM

## 2024-07-19 DIAGNOSIS — H90.3 ASYMMETRICAL SENSORINEURAL HEARING LOSS: ICD-10-CM

## 2024-07-19 NOTE — PROGRESS NOTES
Darling Carty  1965.59 y.o. female   [id number]      HEARING AID EVALUATION    Darling Carty was seen today, 7/19/2024 , for a Hearing Aid Evaluation following audiologic evaluation on 6/26/2024.   Patient was found to not have an insurance benefit for hearing aids. Patient is responsible for any cost over the previously listed benefit (if any).  She does have a discount through [employer]. Hearing aid benefit worksheet scanned into media tab.      DATE: 7/19/2024     PROCEDURES:     REVIEWED AUDIOGRAM AND HEARING EVALUATION RESULTS:         LIFESTYLE EVALUATION:    After a discussion of evaluation results, discussion of levels of technology and prices, and lifestyle assessment, Darling Carty has decided to consider the options and contact Audiology when a decision has been made..     Patient wants to pursue her discount through [employer].      RECOMMENDATIONS:      - Patient wants to pursue her discount through [employer].      No charge for today's appointment.        Caio Marks  Audiologist

## 2024-08-07 LAB
Lab: NORMAL
REPORT: NORMAL
THIS TEST SENT TO: NORMAL

## 2024-08-21 ENCOUNTER — TELEPHONE (OUTPATIENT)
Dept: FAMILY MEDICINE CLINIC | Age: 59
End: 2024-08-21

## 2024-08-21 NOTE — TELEPHONE ENCOUNTER
Patient called in wanting to know if she can switch from ozempic to mounjaro as she doesn't feel like the ozempic is working. Uses [facility] or [facility]

## 2024-08-21 NOTE — TELEPHONE ENCOUNTER
.  Clarify what dose of Ozempic the patient is on?  Would not suspect could get a great deal of benefit switching from one to the other as the other same class of medication.  It may be a simple medication adjustment to further improve the patient's possibility lose weight by increasing Ozempic dosing.

## 2024-08-22 DIAGNOSIS — E11.9 TYPE 2 DIABETES MELLITUS WITHOUT COMPLICATION, WITHOUT LONG-TERM CURRENT USE OF INSULIN (HCC): Primary | ICD-10-CM

## 2024-08-22 RX ORDER — TIRZEPATIDE 2.5 MG/.5ML
2.5 INJECTION, SOLUTION SUBCUTANEOUS WEEKLY
Qty: 4 EACH | Refills: 0 | Status: SHIPPED | OUTPATIENT
Start: 2024-08-22

## 2024-08-22 NOTE — TELEPHONE ENCOUNTER
Patient states she is taking 1mg.  Patient would rather try the Mounjaro at the lowest dose and increase as she goes.

## 2024-08-28 NOTE — PROGRESS NOTES
Outside service performed at [facility] with date of service 6/13/2024.  Op note and pathology report are located in the media section.

## 2024-09-17 DIAGNOSIS — E11.9 TYPE 2 DIABETES MELLITUS WITHOUT COMPLICATION, WITHOUT LONG-TERM CURRENT USE OF INSULIN (HCC): ICD-10-CM

## 2024-09-17 RX ORDER — TIRZEPATIDE 5 MG/.5ML
5 INJECTION, SOLUTION SUBCUTANEOUS WEEKLY
Qty: 2 ML | Refills: 2 | Status: SHIPPED | OUTPATIENT
Start: 2024-09-17

## 2024-09-17 RX ORDER — TIRZEPATIDE 2.5 MG/.5ML
2.5 INJECTION, SOLUTION SUBCUTANEOUS WEEKLY
Qty: 4 EACH | Refills: 0 | Status: CANCELLED | OUTPATIENT
Start: 2024-09-17

## 2024-10-04 ENCOUNTER — OFFICE VISIT (OUTPATIENT)
Dept: FAMILY MEDICINE CLINIC | Age: 59
End: 2024-10-04
Payer: COMMERCIAL

## 2024-10-04 VITALS
SYSTOLIC BLOOD PRESSURE: 110 MMHG | HEART RATE: 86 BPM | WEIGHT: 233 LBS | HEIGHT: 69 IN | DIASTOLIC BLOOD PRESSURE: 74 MMHG | OXYGEN SATURATION: 95 % | BODY MASS INDEX: 34.51 KG/M2

## 2024-10-04 DIAGNOSIS — E11.69 TYPE 2 DIABETES MELLITUS WITH OBESITY (HCC): ICD-10-CM

## 2024-10-04 DIAGNOSIS — I48.0 PAROXYSMAL ATRIAL FIBRILLATION (HCC): ICD-10-CM

## 2024-10-04 DIAGNOSIS — R42 DIZZINESS: Primary | ICD-10-CM

## 2024-10-04 DIAGNOSIS — R13.10 DYSPHAGIA, UNSPECIFIED TYPE: ICD-10-CM

## 2024-10-04 DIAGNOSIS — E66.9 TYPE 2 DIABETES MELLITUS WITH OBESITY (HCC): ICD-10-CM

## 2024-10-04 DIAGNOSIS — I10 ESSENTIAL HYPERTENSION: ICD-10-CM

## 2024-10-04 PROCEDURE — 3078F DIAST BP <80 MM HG: CPT | Performed by: FAMILY MEDICINE

## 2024-10-04 PROCEDURE — 3051F HG A1C>EQUAL 7.0%<8.0%: CPT | Performed by: FAMILY MEDICINE

## 2024-10-04 PROCEDURE — 3074F SYST BP LT 130 MM HG: CPT | Performed by: FAMILY MEDICINE

## 2024-10-04 PROCEDURE — 99214 OFFICE O/P EST MOD 30 MIN: CPT | Performed by: FAMILY MEDICINE

## 2024-10-04 ASSESSMENT — ENCOUNTER SYMPTOMS
TROUBLE SWALLOWING: 1
SHORTNESS OF BREATH: 0

## 2024-10-04 NOTE — PROGRESS NOTES
be in normal sinus rhythm today.  Has been on Xarelto.  No new symptoms noted.  See above regarding labs    4. Dysphagia, unspecified type  Prior thyroid ultrasound did not show any worrisome or significant finding.  She did have a prior esophagogastroduodenoscopy approximately 2 years ago.  Given the difficulty with swallowing that appears to be due, referral as below  - Juan Pablo Rivera MD, Gastroenterology, [facility]    5.  Type 2 diabetes  Tolerating medication.  She is not on a an SGLT2 inhibitor, which could contribute to dizziness if she were taking it.  She has lost some weight.  A little too soon to repeat hemoglobin A1c.    Addm: I took with cardiology.  They agree with discontinuing the amlodipine for now.  They will also reach out to the patient to schedule a follow-up appointment.  Patient can likely stop metoprolol, as this was intended to be a short-term medication as well, but cardiology will address that with the patient    This document was prepared by a combination of typing and transcription through a voice recognition software.

## 2024-10-07 ENCOUNTER — LAB (OUTPATIENT)
Dept: FAMILY MEDICINE CLINIC | Age: 59
End: 2024-10-07
Payer: COMMERCIAL

## 2024-10-07 DIAGNOSIS — R42 DIZZINESS: ICD-10-CM

## 2024-10-07 LAB
ALBUMIN SERPL-MCNC: 4.5 G/DL (ref 3.4–5)
ALBUMIN/GLOB SERPL: 1.9 {RATIO} (ref 1.1–2.2)
ALP SERPL-CCNC: 113 U/L (ref 40–129)
ALT SERPL-CCNC: 20 U/L (ref 10–40)
ANION GAP SERPL CALCULATED.3IONS-SCNC: 13 MMOL/L (ref 3–16)
AST SERPL-CCNC: 26 U/L (ref 15–37)
BASOPHILS # BLD: 0 K/UL (ref 0–0.2)
BASOPHILS NFR BLD: 0.5 %
BILIRUB SERPL-MCNC: 1.2 MG/DL (ref 0–1)
BUN SERPL-MCNC: 14 MG/DL (ref 7–20)
CALCIUM SERPL-MCNC: 10 MG/DL (ref 8.3–10.6)
CHLORIDE SERPL-SCNC: 103 MMOL/L (ref 99–110)
CO2 SERPL-SCNC: 23 MMOL/L (ref 21–32)
CREAT SERPL-MCNC: 1.2 MG/DL (ref 0.6–1.1)
DEPRECATED RDW RBC AUTO: 13.8 % (ref 12.4–15.4)
EOSINOPHIL # BLD: 0.1 K/UL (ref 0–0.6)
EOSINOPHIL NFR BLD: 2.6 %
GFR SERPLBLD CREATININE-BSD FMLA CKD-EPI: 53 ML/MIN/{1.73_M2}
GLUCOSE SERPL-MCNC: 139 MG/DL (ref 70–99)
HCT VFR BLD AUTO: 37.2 % (ref 36–48)
HGB BLD-MCNC: 12.8 G/DL (ref 12–16)
LYMPHOCYTES # BLD: 1.6 K/UL (ref 1–5.1)
LYMPHOCYTES NFR BLD: 31.3 %
MCH RBC QN AUTO: 30.7 PG (ref 26–34)
MCHC RBC AUTO-ENTMCNC: 34.5 G/DL (ref 31–36)
MCV RBC AUTO: 89.2 FL (ref 80–100)
MONOCYTES # BLD: 0.3 K/UL (ref 0–1.3)
MONOCYTES NFR BLD: 6.7 %
NEUTROPHILS # BLD: 2.9 K/UL (ref 1.7–7.7)
NEUTROPHILS NFR BLD: 58.9 %
PLATELET # BLD AUTO: 362 K/UL (ref 135–450)
PMV BLD AUTO: 8.3 FL (ref 5–10.5)
POTASSIUM SERPL-SCNC: 5.2 MMOL/L (ref 3.5–5.1)
PROT SERPL-MCNC: 6.9 G/DL (ref 6.4–8.2)
RBC # BLD AUTO: 4.17 M/UL (ref 4–5.2)
SODIUM SERPL-SCNC: 139 MMOL/L (ref 136–145)
WBC # BLD AUTO: 5 K/UL (ref 4–11)

## 2024-10-07 PROCEDURE — 36415 COLL VENOUS BLD VENIPUNCTURE: CPT | Performed by: FAMILY MEDICINE

## 2024-10-07 NOTE — RESULT ENCOUNTER NOTE
Very mild increase in potassium.  Mild increase in total bilirubin, but alkaline phosphatase improved.  Renal function slightly worse than baseline, but not bad.  Work on improved hydration.  CBC is normal.  Findings on labs do not explain dizziness.

## 2024-10-07 NOTE — PROGRESS NOTES
Blood drawn per order.  Needle size: 23 g  Site: L Antecubital.  First attempt successful No    Second attempt no, third attempt by another ma successful      Pressure applied until bleeding stopped.  Pressure applied.    Patient informed to call office or return if bleeding reoccurs and unable to stop.    Tubes drawn: 1 purple     1 red

## 2024-11-05 DIAGNOSIS — E87.6 LOW SERUM POTASSIUM: ICD-10-CM

## 2024-11-05 RX ORDER — POTASSIUM CHLORIDE 750 MG/1
30 TABLET, EXTENDED RELEASE ORAL 2 TIMES DAILY
Qty: 270 TABLET | Refills: 3 | Status: SHIPPED | OUTPATIENT
Start: 2024-11-05

## 2024-11-06 ENCOUNTER — OFFICE VISIT (OUTPATIENT)
Dept: FAMILY MEDICINE CLINIC | Age: 59
End: 2024-11-06

## 2024-11-06 VITALS
HEIGHT: 69 IN | DIASTOLIC BLOOD PRESSURE: 78 MMHG | HEART RATE: 80 BPM | SYSTOLIC BLOOD PRESSURE: 116 MMHG | BODY MASS INDEX: 33.33 KG/M2 | OXYGEN SATURATION: 98 % | WEIGHT: 225 LBS

## 2024-11-06 DIAGNOSIS — R55 SYNCOPE, UNSPECIFIED SYNCOPE TYPE: Primary | ICD-10-CM

## 2024-11-06 DIAGNOSIS — E11.9 TYPE 2 DIABETES MELLITUS WITHOUT COMPLICATION, WITHOUT LONG-TERM CURRENT USE OF INSULIN (HCC): ICD-10-CM

## 2024-11-06 DIAGNOSIS — I10 ESSENTIAL HYPERTENSION: ICD-10-CM

## 2024-11-06 DIAGNOSIS — R00.2 PALPITATIONS: ICD-10-CM

## 2024-11-06 RX ORDER — LISINOPRIL 40 MG/1
20 TABLET ORAL DAILY
Qty: 90 TABLET | Refills: 2
Start: 2024-11-06

## 2024-11-06 ASSESSMENT — ENCOUNTER SYMPTOMS
CONSTIPATION: 0
VOMITING: 0
NAUSEA: 0
SHORTNESS OF BREATH: 0

## 2024-11-06 NOTE — PROGRESS NOTES
monitoring kit 1 kit by Does not apply route daily Yes Vinnie Cespedes MD   FREESTYLE LANCETS MISC 1 each by Does not apply route daily Yes Vinnie Cespedes MD       Allergies   Allergen Reactions    Latex Itching    Sulfa Antibiotics Swelling       OBJECTIVE:    /78 Comment: standing  Pulse 80   Ht 1.74 m (5' 8.5\")   Wt 102.1 kg (225 lb)   SpO2 98%   BMI 33.71 kg/m²     BP Readings from Last 2 Encounters:   11/06/24 116/78   10/04/24 110/74       Wt Readings from Last 3 Encounters:   11/06/24 102.1 kg (225 lb)   10/04/24 105.7 kg (233 lb)   07/11/24 109.4 kg (241 lb 3.2 oz)       Physical Exam  Constitutional:       Appearance: Normal appearance.   Eyes:      Extraocular Movements: Extraocular movements intact.   Cardiovascular:      Rate and Rhythm: Normal rate and regular rhythm.      Pulses: Normal pulses.      Heart sounds: Normal heart sounds. No murmur heard.  Pulmonary:      Effort: Pulmonary effort is normal.      Breath sounds: Normal breath sounds.   Abdominal:      General: Bowel sounds are normal.   Musculoskeletal:         General: Normal range of motion.   Skin:     General: Skin is warm.      Capillary Refill: Capillary refill takes less than 2 seconds.   Neurological:      General: No focal deficit present.      Mental Status: She is alert and oriented to person, place, and time.   Psychiatric:         Mood and Affect: Mood normal.         Behavior: Behavior normal.           ASSESSMENT/PLAN:    1. Syncope, unspecified syncope type  Suspect multifactorial. Has stopped amlodipine and metoprolol.  Has cardio appt. Bp still borderline low.  Has lost a sig amt of weight w/ mounjaro.  Try cutting lisin in half.  Discussed lower cymbalta dose if ongoing issues.  Consider echo if ongoing issues as well.  F/u w/ cardio as sched    2. Essential hypertension  See above.  Consider stopping spironolactone as well  - lisinopril (PRINIVIL;ZESTRIL) 40 MG tablet; Take 0.5 tablets by mouth daily

## 2024-11-07 DIAGNOSIS — E11.9 TYPE 2 DIABETES MELLITUS WITHOUT COMPLICATION, WITHOUT LONG-TERM CURRENT USE OF INSULIN (HCC): ICD-10-CM

## 2024-11-07 LAB
ALBUMIN SERPL-MCNC: 4.7 G/DL (ref 3.4–5)
ALBUMIN/GLOB SERPL: 2.1 {RATIO} (ref 1.1–2.2)
ALP SERPL-CCNC: 111 U/L (ref 40–129)
ALT SERPL-CCNC: 18 U/L (ref 10–40)
ANION GAP SERPL CALCULATED.3IONS-SCNC: 13 MMOL/L (ref 3–16)
AST SERPL-CCNC: 23 U/L (ref 15–37)
BILIRUB SERPL-MCNC: 1.1 MG/DL (ref 0–1)
BUN SERPL-MCNC: 11 MG/DL (ref 7–20)
CALCIUM SERPL-MCNC: 9.8 MG/DL (ref 8.3–10.6)
CHLORIDE SERPL-SCNC: 99 MMOL/L (ref 99–110)
CHOLEST SERPL-MCNC: 115 MG/DL (ref 0–199)
CO2 SERPL-SCNC: 23 MMOL/L (ref 21–32)
CREAT SERPL-MCNC: 1.3 MG/DL (ref 0.6–1.1)
EST. AVERAGE GLUCOSE BLD GHB EST-MCNC: 151.3 MG/DL
GFR SERPLBLD CREATININE-BSD FMLA CKD-EPI: 48 ML/MIN/{1.73_M2}
GLUCOSE SERPL-MCNC: 107 MG/DL (ref 70–99)
HBA1C MFR BLD: 6.9 %
HDLC SERPL-MCNC: 38 MG/DL (ref 40–60)
LDLC SERPL CALC-MCNC: 42 MG/DL
POTASSIUM SERPL-SCNC: 4.7 MMOL/L (ref 3.5–5.1)
PROT SERPL-MCNC: 6.9 G/DL (ref 6.4–8.2)
SODIUM SERPL-SCNC: 135 MMOL/L (ref 136–145)
TRIGL SERPL-MCNC: 175 MG/DL (ref 0–150)
VLDLC SERPL CALC-MCNC: 35 MG/DL

## 2024-11-07 RX ORDER — TIRZEPATIDE 7.5 MG/.5ML
7.5 INJECTION, SOLUTION SUBCUTANEOUS WEEKLY
Qty: 6 ML | Refills: 5 | Status: SHIPPED | OUTPATIENT
Start: 2024-11-07

## 2024-11-07 NOTE — RESULT ENCOUNTER NOTE
Total cholesterol significantly improved.  Triglycerides significantly improved.  Renal function mildly reduced from last check.  Continue to work on appropriate hydration.  Continue to monitor periodically.  Otherwise CMP is okay.  Mild elevation in bilirubin, improved from last check.  Hemoglobin A1c improved at 6.9.  She was interested in potentially increasing the Mounjaro dose.  With next refill, go to Mounjaro 7.5 mg weekly.  Can reduce metformin to once daily at that time.  Repeat labs and follow-up in approximately 3 months.

## 2024-11-19 LAB — TOTAL CK: 77.8 U/L

## 2024-12-10 RX ORDER — TIRZEPATIDE 5 MG/.5ML
INJECTION, SOLUTION SUBCUTANEOUS
Qty: 4 ML | Refills: 0 | OUTPATIENT
Start: 2024-12-10

## 2024-12-10 NOTE — TELEPHONE ENCOUNTER
I have listed that we had increased her Mounjaro dose to 7.5 mg weekly approximately 1 month ago.  Patient was given several refills.  This appears to be a duplicate prescription

## 2024-12-10 NOTE — TELEPHONE ENCOUNTER
Future appt scheduled 0 appt scheduled                   Last appt 11/06/2024      Refill Request     CONFIRM preferrred pharmacy with the patient.    If Mail Order Rx - Pend for 90 day refill.      Last Seen: Last Seen Department: 11/6/2024  Last Seen by PCP: 11/6/2024    Last Written: 11/07/2024    If no future appointment scheduled, route STAFF MESSAGE with patient name to the  Pool for scheduling.      Next Appointment:   Future Appointments   Date Time Provider Department Center   12/19/2024  2:00 PM Kellie Olmedo AuD [facility]   12/19/2024  2:30 PM Christianne Simeon DO [facility]       Message sent to  to schedule appt with patient?  NO      Requested Prescriptions     Pending Prescriptions Disp Refills    MOUNJARO 5 MG/0.5ML SOAJ [Pharmacy Med Name: Mounjaro 5 MG/0.5ML Subcutaneous Solution Pen-injector] 4 mL 0     Sig: INJECT 1 SHOT SUBCUTANEOUSLY ONCE A WEEK

## 2025-01-09 DIAGNOSIS — I10 ESSENTIAL HYPERTENSION: ICD-10-CM

## 2025-01-09 RX ORDER — LISINOPRIL 40 MG/1
40 TABLET ORAL DAILY
Qty: 90 TABLET | Refills: 1 | Status: SHIPPED | OUTPATIENT
Start: 2025-01-09

## 2025-01-27 ENCOUNTER — OFFICE VISIT (OUTPATIENT)
Dept: FAMILY MEDICINE CLINIC | Age: 60
End: 2025-01-27
Payer: MEDICARE

## 2025-01-27 VITALS
HEIGHT: 69 IN | DIASTOLIC BLOOD PRESSURE: 68 MMHG | SYSTOLIC BLOOD PRESSURE: 104 MMHG | OXYGEN SATURATION: 97 % | BODY MASS INDEX: 31.19 KG/M2 | HEART RATE: 80 BPM | TEMPERATURE: 96.4 F | WEIGHT: 210.6 LBS

## 2025-01-27 DIAGNOSIS — J40 BRONCHITIS: Primary | ICD-10-CM

## 2025-01-27 DIAGNOSIS — Z13.31 POSITIVE DEPRESSION SCREENING: ICD-10-CM

## 2025-01-27 PROCEDURE — 3078F DIAST BP <80 MM HG: CPT | Performed by: FAMILY MEDICINE

## 2025-01-27 PROCEDURE — 3074F SYST BP LT 130 MM HG: CPT | Performed by: FAMILY MEDICINE

## 2025-01-27 PROCEDURE — 99213 OFFICE O/P EST LOW 20 MIN: CPT | Performed by: FAMILY MEDICINE

## 2025-01-27 RX ORDER — DOXYCYCLINE HYCLATE 100 MG
100 TABLET ORAL 2 TIMES DAILY
Qty: 14 TABLET | Refills: 0 | Status: SHIPPED | OUTPATIENT
Start: 2025-01-27 | End: 2025-02-03

## 2025-01-27 RX ORDER — BUDESONIDE AND FORMOTEROL FUMARATE DIHYDRATE 160; 4.5 UG/1; UG/1
2 AEROSOL RESPIRATORY (INHALATION) 2 TIMES DAILY
Qty: 30.6 G | Refills: 0 | Status: SHIPPED | OUTPATIENT
Start: 2025-01-27

## 2025-01-27 RX ORDER — CETIRIZINE HYDROCHLORIDE 10 MG/1
10 TABLET ORAL DAILY
Qty: 30 TABLET | Refills: 0 | Status: SHIPPED | OUTPATIENT
Start: 2025-01-27

## 2025-01-27 SDOH — ECONOMIC STABILITY: FOOD INSECURITY: WITHIN THE PAST 12 MONTHS, YOU WORRIED THAT YOUR FOOD WOULD RUN OUT BEFORE YOU GOT MONEY TO BUY MORE.: NEVER TRUE

## 2025-01-27 SDOH — ECONOMIC STABILITY: FOOD INSECURITY: WITHIN THE PAST 12 MONTHS, THE FOOD YOU BOUGHT JUST DIDN'T LAST AND YOU DIDN'T HAVE MONEY TO GET MORE.: NEVER TRUE

## 2025-01-27 ASSESSMENT — PATIENT HEALTH QUESTIONNAIRE - PHQ9
7. TROUBLE CONCENTRATING ON THINGS, SUCH AS READING THE NEWSPAPER OR WATCHING TELEVISION: MORE THAN HALF THE DAYS
10. IF YOU CHECKED OFF ANY PROBLEMS, HOW DIFFICULT HAVE THESE PROBLEMS MADE IT FOR YOU TO DO YOUR WORK, TAKE CARE OF THINGS AT HOME, OR GET ALONG WITH OTHER PEOPLE: SOMEWHAT DIFFICULT
SUM OF ALL RESPONSES TO PHQ QUESTIONS 1-9: 10
6. FEELING BAD ABOUT YOURSELF - OR THAT YOU ARE A FAILURE OR HAVE LET YOURSELF OR YOUR FAMILY DOWN: SEVERAL DAYS
SUM OF ALL RESPONSES TO PHQ QUESTIONS 1-9: 10
SUM OF ALL RESPONSES TO PHQ9 QUESTIONS 1 & 2: 1
9. THOUGHTS THAT YOU WOULD BE BETTER OFF DEAD, OR OF HURTING YOURSELF: NOT AT ALL
SUM OF ALL RESPONSES TO PHQ QUESTIONS 1-9: 10
2. FEELING DOWN, DEPRESSED OR HOPELESS: SEVERAL DAYS
3. TROUBLE FALLING OR STAYING ASLEEP: SEVERAL DAYS
SUM OF ALL RESPONSES TO PHQ QUESTIONS 1-9: 10
8. MOVING OR SPEAKING SO SLOWLY THAT OTHER PEOPLE COULD HAVE NOTICED. OR THE OPPOSITE, BEING SO FIGETY OR RESTLESS THAT YOU HAVE BEEN MOVING AROUND A LOT MORE THAN USUAL: SEVERAL DAYS
1. LITTLE INTEREST OR PLEASURE IN DOING THINGS: NOT AT ALL
5. POOR APPETITE OR OVEREATING: SEVERAL DAYS
4. FEELING TIRED OR HAVING LITTLE ENERGY: NEARLY EVERY DAY

## 2025-01-27 NOTE — PROGRESS NOTES
today: (PHQ-9 Total Score: 10), additional evaluation and assessment performed, follow-up plan includes but not limited to: Medication management and Referral to /Specialist  for evaluation and management.

## 2025-02-06 ENCOUNTER — TELEPHONE (OUTPATIENT)
Dept: FAMILY MEDICINE CLINIC | Age: 60
End: 2025-02-06

## 2025-02-06 DIAGNOSIS — B37.31 VAGINAL YEAST INFECTION: Primary | ICD-10-CM

## 2025-02-06 RX ORDER — FLUCONAZOLE 150 MG/1
150 TABLET ORAL ONCE
Qty: 1 TABLET | Refills: 0 | Status: SHIPPED | OUTPATIENT
Start: 2025-02-06 | End: 2025-02-06

## 2025-02-06 NOTE — TELEPHONE ENCOUNTER
Pt was on abx and is now experiencing yeast infection sxs and was wondering if you could send something in to [facility]?

## 2025-02-12 DIAGNOSIS — E78.2 MIXED HYPERLIPIDEMIA: ICD-10-CM

## 2025-02-12 RX ORDER — EZETIMIBE 10 MG/1
10 TABLET ORAL DAILY
Qty: 90 TABLET | Refills: 3 | Status: SHIPPED | OUTPATIENT
Start: 2025-02-12

## 2025-02-16 LAB — TROPONIN: <0.012

## 2025-03-10 ENCOUNTER — OFFICE VISIT (OUTPATIENT)
Dept: FAMILY MEDICINE CLINIC | Age: 60
End: 2025-03-10
Payer: MEDICARE

## 2025-03-10 VITALS
HEIGHT: 69 IN | HEART RATE: 92 BPM | WEIGHT: 199.8 LBS | DIASTOLIC BLOOD PRESSURE: 74 MMHG | SYSTOLIC BLOOD PRESSURE: 122 MMHG | OXYGEN SATURATION: 97 % | BODY MASS INDEX: 29.59 KG/M2

## 2025-03-10 DIAGNOSIS — E11.9 TYPE 2 DIABETES MELLITUS WITHOUT COMPLICATION, WITHOUT LONG-TERM CURRENT USE OF INSULIN (HCC): ICD-10-CM

## 2025-03-10 DIAGNOSIS — K21.9 GASTROESOPHAGEAL REFLUX DISEASE, UNSPECIFIED WHETHER ESOPHAGITIS PRESENT: ICD-10-CM

## 2025-03-10 DIAGNOSIS — I48.0 PAROXYSMAL ATRIAL FIBRILLATION (HCC): ICD-10-CM

## 2025-03-10 DIAGNOSIS — E66.9 TYPE 2 DIABETES MELLITUS WITH OBESITY (HCC): ICD-10-CM

## 2025-03-10 DIAGNOSIS — E11.69 TYPE 2 DIABETES MELLITUS WITH OBESITY (HCC): ICD-10-CM

## 2025-03-10 DIAGNOSIS — E78.2 MIXED HYPERLIPIDEMIA: ICD-10-CM

## 2025-03-10 DIAGNOSIS — R55 SYNCOPE, UNSPECIFIED SYNCOPE TYPE: Primary | ICD-10-CM

## 2025-03-10 DIAGNOSIS — I10 PRIMARY HYPERTENSION: ICD-10-CM

## 2025-03-10 DIAGNOSIS — E03.9 HYPOTHYROIDISM, UNSPECIFIED TYPE: ICD-10-CM

## 2025-03-10 PROBLEM — E66.01 SEVERE OBESITY (BMI 35.0-39.9) WITH COMORBIDITY: Status: RESOLVED | Noted: 2017-07-13 | Resolved: 2025-03-10

## 2025-03-10 LAB — HBA1C MFR BLD: 6.1 %

## 2025-03-10 PROCEDURE — 3074F SYST BP LT 130 MM HG: CPT | Performed by: FAMILY MEDICINE

## 2025-03-10 PROCEDURE — 3078F DIAST BP <80 MM HG: CPT | Performed by: FAMILY MEDICINE

## 2025-03-10 PROCEDURE — 99214 OFFICE O/P EST MOD 30 MIN: CPT | Performed by: FAMILY MEDICINE

## 2025-03-10 PROCEDURE — 83036 HEMOGLOBIN GLYCOSYLATED A1C: CPT | Performed by: FAMILY MEDICINE

## 2025-03-10 PROCEDURE — 3044F HG A1C LEVEL LT 7.0%: CPT | Performed by: FAMILY MEDICINE

## 2025-03-10 RX ORDER — METOPROLOL SUCCINATE 25 MG/1
25 TABLET, EXTENDED RELEASE ORAL DAILY
Qty: 90 TABLET | Refills: 3 | OUTPATIENT
Start: 2025-03-10

## 2025-03-10 RX ORDER — ATORVASTATIN CALCIUM 80 MG/1
80 TABLET, FILM COATED ORAL DAILY
Qty: 90 TABLET | Refills: 2 | OUTPATIENT
Start: 2025-03-10

## 2025-03-10 RX ORDER — PANTOPRAZOLE SODIUM 20 MG/1
TABLET, DELAYED RELEASE ORAL
Qty: 180 TABLET | Refills: 3 | OUTPATIENT
Start: 2025-03-10

## 2025-03-10 RX ORDER — PANTOPRAZOLE SODIUM 20 MG/1
20 TABLET, DELAYED RELEASE ORAL 2 TIMES DAILY
Qty: 180 TABLET | Refills: 3 | Status: SHIPPED | OUTPATIENT
Start: 2025-03-10 | End: 2026-03-05

## 2025-03-10 RX ORDER — ATORVASTATIN CALCIUM 80 MG/1
TABLET, FILM COATED ORAL
Qty: 90 TABLET | Refills: 3 | Status: SHIPPED | OUTPATIENT
Start: 2025-03-10

## 2025-03-10 NOTE — ASSESSMENT & PLAN NOTE
Patient states she is not taking the bisoprolol or spironolactone since the event.  Is due to follow-up with cardiology.  Suspect given her significant weight loss over the last 3 months she may have been overly medicated with antihypertensives

## 2025-03-10 NOTE — ASSESSMENT & PLAN NOTE
Patient states she can feel when she is out of rhythm has not had any recent episodes that she is aware of but has been wearing the event monitor.

## 2025-03-10 NOTE — ASSESSMENT & PLAN NOTE
Given the significant weight loss since her last A1c will repeat in the office today.  Consider hypoglycemia if no arrhythmia noted.    Orders:    blood glucose test strips (ASCENSIA AUTODISC VI;ONE TOUCH ULTRA TEST VI) strip; 1 each by Does not apply route daily    POCT glycosylated hemoglobin (Hb A1C)

## 2025-03-10 NOTE — PROGRESS NOTES
Darling Carty (:  1965) is a 59 y.o. female,Established patient, here for evaluation of the following chief complaint(s):  Follow-up (Syncope-/Lip laceration)           Assessment & Plan  Syncope, unspecified syncope type    Patient was wearing an event recorder at the time of the syncopal episode.  Follow-up with cardiology         Type 2 diabetes mellitus with obesity (HCC)    Given the significant weight loss since her last A1c will repeat in the office today.  Consider hypoglycemia if no arrhythmia noted.    Orders:    blood glucose test strips (ASCENSIA AUTODISC VI;ONE TOUCH ULTRA TEST VI) strip; 1 each by Does not apply route daily    POCT glycosylated hemoglobin (Hb A1C)    Paroxysmal atrial fibrillation (HCC)    Patient states she can feel when she is out of rhythm has not had any recent episodes that she is aware of but has been wearing the event monitor.         Mixed hyperlipidemia    Stable continue current medication         Hypothyroidism, unspecified type  Will follow-up with future lab work         Primary hypertension    Patient states she is not taking the bisoprolol or spironolactone since the event.  Is due to follow-up with cardiology.  Suspect given her significant weight loss over the last 3 months she may have been overly medicated with antihypertensives           Return in about 3 months (around 6/10/2025) for med check.       Subjective   HPI  59-year-old female here for follow-up visit.  No headache chest pain shortness of breath nausea vomiting.  She does have discomfort in the lip where she had a laceration she has what appears to be a dissolvable suture in place.  Review of Systems   See above    Objective   Physical Exam  HENT:      Head: Normocephalic and atraumatic.      Mouth/Throat:      Comments: A single suture is in place in the internal upper lip.  This appears to be a dissolvable suture  Eyes:      Extraocular Movements: Extraocular movements intact.

## 2025-03-17 RX ORDER — METOPROLOL SUCCINATE 25 MG/1
25 TABLET, EXTENDED RELEASE ORAL DAILY
Qty: 90 TABLET | Refills: 3 | OUTPATIENT
Start: 2025-03-17

## 2025-04-10 DIAGNOSIS — F41.9 ANXIETY: ICD-10-CM

## 2025-04-10 RX ORDER — FLUOXETINE HYDROCHLORIDE 40 MG/1
40 CAPSULE ORAL 2 TIMES DAILY
Qty: 180 CAPSULE | Refills: 1 | Status: SHIPPED | OUTPATIENT
Start: 2025-04-10

## 2025-05-05 DIAGNOSIS — E87.6 LOW SERUM POTASSIUM: ICD-10-CM

## 2025-05-05 RX ORDER — POTASSIUM CHLORIDE 750 MG/1
30 TABLET, EXTENDED RELEASE ORAL 2 TIMES DAILY
Qty: 270 TABLET | Refills: 3 | Status: SHIPPED | OUTPATIENT
Start: 2025-05-05

## 2025-05-14 ENCOUNTER — OFFICE VISIT (OUTPATIENT)
Dept: FAMILY MEDICINE CLINIC | Age: 60
End: 2025-05-14

## 2025-05-14 VITALS
HEART RATE: 78 BPM | DIASTOLIC BLOOD PRESSURE: 76 MMHG | SYSTOLIC BLOOD PRESSURE: 122 MMHG | WEIGHT: 188 LBS | RESPIRATION RATE: 20 BRPM | OXYGEN SATURATION: 98 % | BODY MASS INDEX: 28.17 KG/M2

## 2025-05-14 DIAGNOSIS — L91.8 SKIN TAG: ICD-10-CM

## 2025-05-14 DIAGNOSIS — E11.9 TYPE 2 DIABETES MELLITUS WITHOUT COMPLICATION, WITHOUT LONG-TERM CURRENT USE OF INSULIN (HCC): ICD-10-CM

## 2025-05-14 DIAGNOSIS — I10 ESSENTIAL HYPERTENSION: ICD-10-CM

## 2025-05-14 DIAGNOSIS — N30.00 ACUTE CYSTITIS WITHOUT HEMATURIA: Primary | ICD-10-CM

## 2025-05-14 LAB
BILIRUBIN, POC: NORMAL
BLOOD URINE, POC: NORMAL
CLARITY, POC: CLEAR
COLOR, POC: YELLOW
GLUCOSE URINE, POC: NORMAL MG/DL
KETONES, POC: NORMAL MG/DL
LEUKOCYTE EST, POC: NORMAL
NITRITE, POC: NORMAL
PH, POC: 7
PROTEIN, POC: 30 MG/DL
SPECIFIC GRAVITY, POC: 1.02
UROBILINOGEN, POC: 1 MG/DL

## 2025-05-14 RX ORDER — CIPROFLOXACIN 500 MG/1
500 TABLET, FILM COATED ORAL 2 TIMES DAILY
Qty: 14 TABLET | Refills: 0 | Status: SHIPPED | OUTPATIENT
Start: 2025-05-14 | End: 2025-05-21

## 2025-05-14 NOTE — PROGRESS NOTES
Darling Carty (:  1965) is a 60 y.o. female,Established patient, here for evaluation of the following chief complaint(s):  Urinary Tract Infection (Pt states she thinks she has a uti having sx of itching and burning )           Assessment & Plan  Acute cystitis without hematuria    Due to complaints of pain radiating to the back and presence of symptoms for greater than 2 weeks will cover with Cipro.    Orders:    ciprofloxacin (CIPRO) 500 MG tablet; Take 1 tablet by mouth 2 times daily for 7 days    Type 2 diabetes mellitus without complication, without long-term current use of insulin (HCC)    Patient indicates today due to the weight loss and use the Mounjaro she has self discontinued the metformin.  Will check A1c at next visit.         Essential hypertension    Patient has had medications discontinued by cardiology following syncopal episode.  Await to see if blood pressure goes up after treating the infection.         Skin tag    Skin tag right thigh wide base.  Recommend follow-up visit for excision.           Return if symptoms worsen or fail to improve.       Subjective   HPI  60-year-old female here for acute visit.  Complains of burning frequency flank pain bilateral.  Denies fevers chills.  No further syncopal episodes.    Review of Systems   As above    Objective   Vitals:    25 0913   BP: 122/76   Pulse: 78   Resp: 20   SpO2: 98%     Physical Exam  Constitutional:       Appearance: Normal appearance.   HENT:      Head: Normocephalic and atraumatic.   Eyes:      Extraocular Movements: Extraocular movements intact.   Abdominal:      Tenderness: There is left CVA tenderness.   Neurological:      General: No focal deficit present.      Mental Status: She is alert.                This dictation was generated by a voice recognition computer software.  Although all attempts are made to edit the dictation for accuracy, there may be errors in the transcription that are not intended.    An

## 2025-05-14 NOTE — ASSESSMENT & PLAN NOTE
Patient has had medications discontinued by cardiology following syncopal episode.  Await to see if blood pressure goes up after treating the infection.

## 2025-05-14 NOTE — ASSESSMENT & PLAN NOTE
Patient indicates today due to the weight loss and use the Mounjaro she has self discontinued the metformin.  Will check A1c at next visit.

## 2025-05-21 ENCOUNTER — TELEPHONE (OUTPATIENT)
Dept: FAMILY MEDICINE CLINIC | Age: 60
End: 2025-05-21

## 2025-05-21 DIAGNOSIS — E11.9 TYPE 2 DIABETES MELLITUS WITHOUT COMPLICATION, WITHOUT LONG-TERM CURRENT USE OF INSULIN (HCC): ICD-10-CM

## 2025-05-21 NOTE — TELEPHONE ENCOUNTER
Pt informed of dose increase   
Pt requesting to up her dose of mounjaro she is currently taking the 7.5 but would like to increase   
None needed

## 2025-06-04 DIAGNOSIS — E11.9 TYPE 2 DIABETES MELLITUS WITHOUT COMPLICATION, WITHOUT LONG-TERM CURRENT USE OF INSULIN (HCC): ICD-10-CM

## 2025-06-10 ENCOUNTER — OFFICE VISIT (OUTPATIENT)
Dept: FAMILY MEDICINE CLINIC | Age: 60
End: 2025-06-10

## 2025-06-10 VITALS
HEIGHT: 69 IN | DIASTOLIC BLOOD PRESSURE: 82 MMHG | OXYGEN SATURATION: 98 % | WEIGHT: 185.2 LBS | BODY MASS INDEX: 27.43 KG/M2 | SYSTOLIC BLOOD PRESSURE: 126 MMHG | HEART RATE: 96 BPM

## 2025-06-10 DIAGNOSIS — E66.9 TYPE 2 DIABETES MELLITUS WITH OBESITY (HCC): ICD-10-CM

## 2025-06-10 DIAGNOSIS — Z00.00 MEDICARE ANNUAL WELLNESS VISIT, SUBSEQUENT: Primary | ICD-10-CM

## 2025-06-10 DIAGNOSIS — E11.69 TYPE 2 DIABETES MELLITUS WITH OBESITY (HCC): ICD-10-CM

## 2025-06-10 LAB
ALBUMIN SERPL-MCNC: 4.3 G/DL (ref 3.4–5)
ALBUMIN/GLOB SERPL: 2 {RATIO} (ref 1.1–2.2)
ALP SERPL-CCNC: 106 U/L (ref 40–129)
ALT SERPL-CCNC: 12 U/L (ref 10–40)
ANION GAP SERPL CALCULATED.3IONS-SCNC: 10 MMOL/L (ref 3–16)
AST SERPL-CCNC: 19 U/L (ref 15–37)
BASOPHILS # BLD: 0 K/UL (ref 0–0.2)
BASOPHILS NFR BLD: 0.4 %
BILIRUB SERPL-MCNC: 0.9 MG/DL (ref 0–1)
BUN SERPL-MCNC: 9 MG/DL (ref 7–20)
CALCIUM SERPL-MCNC: 9.4 MG/DL (ref 8.3–10.6)
CHLORIDE SERPL-SCNC: 102 MMOL/L (ref 99–110)
CHOLEST SERPL-MCNC: 120 MG/DL (ref 0–199)
CO2 SERPL-SCNC: 29 MMOL/L (ref 21–32)
CREAT SERPL-MCNC: 0.9 MG/DL (ref 0.6–1.1)
DEPRECATED RDW RBC AUTO: 13.2 % (ref 12.4–15.4)
EOSINOPHIL # BLD: 0.1 K/UL (ref 0–0.6)
EOSINOPHIL NFR BLD: 1.5 %
EST. AVERAGE GLUCOSE BLD GHB EST-MCNC: 105.4 MG/DL
GFR SERPLBLD CREATININE-BSD FMLA CKD-EPI: 74 ML/MIN/{1.73_M2}
GLUCOSE SERPL-MCNC: 100 MG/DL (ref 70–99)
HBA1C MFR BLD: 5.3 %
HCT VFR BLD AUTO: 34.4 % (ref 36–48)
HDLC SERPL-MCNC: 43 MG/DL (ref 40–60)
HGB BLD-MCNC: 11.6 G/DL (ref 12–16)
LDLC SERPL CALC-MCNC: 46 MG/DL
LYMPHOCYTES # BLD: 1 K/UL (ref 1–5.1)
LYMPHOCYTES NFR BLD: 20.9 %
MCH RBC QN AUTO: 29.6 PG (ref 26–34)
MCHC RBC AUTO-ENTMCNC: 33.8 G/DL (ref 31–36)
MCV RBC AUTO: 87.5 FL (ref 80–100)
MONOCYTES # BLD: 0.3 K/UL (ref 0–1.3)
MONOCYTES NFR BLD: 7.1 %
NEUTROPHILS # BLD: 3.3 K/UL (ref 1.7–7.7)
NEUTROPHILS NFR BLD: 70.1 %
PLATELET # BLD AUTO: 360 K/UL (ref 135–450)
PMV BLD AUTO: 8.4 FL (ref 5–10.5)
POTASSIUM SERPL-SCNC: 4.4 MMOL/L (ref 3.5–5.1)
PROT SERPL-MCNC: 6.4 G/DL (ref 6.4–8.2)
RBC # BLD AUTO: 3.92 M/UL (ref 4–5.2)
SODIUM SERPL-SCNC: 141 MMOL/L (ref 136–145)
TRIGL SERPL-MCNC: 153 MG/DL (ref 0–150)
VLDLC SERPL CALC-MCNC: 31 MG/DL
WBC # BLD AUTO: 4.7 K/UL (ref 4–11)

## 2025-06-10 ASSESSMENT — PATIENT HEALTH QUESTIONNAIRE - PHQ9
SUM OF ALL RESPONSES TO PHQ QUESTIONS 1-9: 14
3. TROUBLE FALLING OR STAYING ASLEEP: MORE THAN HALF THE DAYS
10. IF YOU CHECKED OFF ANY PROBLEMS, HOW DIFFICULT HAVE THESE PROBLEMS MADE IT FOR YOU TO DO YOUR WORK, TAKE CARE OF THINGS AT HOME, OR GET ALONG WITH OTHER PEOPLE: SOMEWHAT DIFFICULT
7. TROUBLE CONCENTRATING ON THINGS, SUCH AS READING THE NEWSPAPER OR WATCHING TELEVISION: MORE THAN HALF THE DAYS
SUM OF ALL RESPONSES TO PHQ QUESTIONS 1-9: 14
2. FEELING DOWN, DEPRESSED OR HOPELESS: MORE THAN HALF THE DAYS
SUM OF ALL RESPONSES TO PHQ QUESTIONS 1-9: 14
SUM OF ALL RESPONSES TO PHQ QUESTIONS 1-9: 14
5. POOR APPETITE OR OVEREATING: MORE THAN HALF THE DAYS
8. MOVING OR SPEAKING SO SLOWLY THAT OTHER PEOPLE COULD HAVE NOTICED. OR THE OPPOSITE, BEING SO FIGETY OR RESTLESS THAT YOU HAVE BEEN MOVING AROUND A LOT MORE THAN USUAL: NOT AT ALL
4. FEELING TIRED OR HAVING LITTLE ENERGY: MORE THAN HALF THE DAYS
9. THOUGHTS THAT YOU WOULD BE BETTER OFF DEAD, OR OF HURTING YOURSELF: NOT AT ALL
6. FEELING BAD ABOUT YOURSELF - OR THAT YOU ARE A FAILURE OR HAVE LET YOURSELF OR YOUR FAMILY DOWN: MORE THAN HALF THE DAYS
1. LITTLE INTEREST OR PLEASURE IN DOING THINGS: MORE THAN HALF THE DAYS

## 2025-06-10 ASSESSMENT — LIFESTYLE VARIABLES
HOW OFTEN DO YOU HAVE A DRINK CONTAINING ALCOHOL: NEVER
HOW MANY STANDARD DRINKS CONTAINING ALCOHOL DO YOU HAVE ON A TYPICAL DAY: PATIENT DOES NOT DRINK
HOW OFTEN DO YOU HAVE A DRINK CONTAINING ALCOHOL: NEVER

## 2025-06-10 NOTE — PROGRESS NOTES
Medicare Annual Wellness Visit    Darling Carty is here for Medicare AWV, Hypertension, Hyperlipidemia, and Diabetes    Assessment & Plan   Medicare annual wellness visit, subsequent  -     Hemoglobin A1C  -     Comprehensive Metabolic Panel  -     CBC with Auto Differential  -     Lipid Panel  Type 2 diabetes mellitus with obesity (HCC)  -     Hemoglobin A1C  -     Comprehensive Metabolic Panel  -     CBC with Auto Differential  -     Lipid Panel     Return if symptoms worsen or fail to improve, for procedure - skin tag removal. .     Subjective       Patient's complete Health Risk Assessment and screening values have been reviewed and are found in Flowsheets. The following problems were reviewed today and where indicated follow up appointments were made and/or referrals ordered.    Positive Risk Factor Screenings with Interventions:    Fall Risk:  Do you feel unsteady or are you worried about falling? : no  2 or more falls in past year?: (!) yes  Fall with injury in past year?: (!) yes     Interventions:    Reviewed medications, home hazards, visual acuity, and co-morbidities that can increase risk for falls     Depression:  PHQ-2 Score: 4  PHQ-9 Total Score: 14  Total Score Interpretation: 10-14 = moderate depression  Interventions:  Patient declines any further evaluation or treatment          General HRA Questions:  Select all that apply: (!) Anger, Stress  Interventions - Stress:  Patient declined any further interventions or treatment  Interventions - Anger:  Patient declined any further interventions or treatment      Inactivity:  On average, how many days per week do you engage in moderate to strenuous exercise (like a brisk walk)?: 1 day (!) Abnormal     Interventions:  Patient declined any further interventions or treatment       Hearing Screen:  Do you or your family notice any trouble with your hearing that hasn't been managed with hearing aids?: (!) Yes    Interventions:  Patient declines any further

## 2025-06-10 NOTE — PATIENT INSTRUCTIONS
appointments, and call your doctor if you are having problems. It's also a good idea to know your test results and keep a list of the medicines you take.  Where can you learn more?  Go to [email or web address] and enter M676 to learn more about \"Learning About Mindfulness for Stress.\"  Current as of: July 31, 2024  Content Version: 14.5  © 9681-7309 [employer].   Care instructions adapted under license by [employer]. If you have questions about a medical condition or this instruction, always ask your healthcare professional. [employer], disclaims any warranty or liability for your use of this information.         Learning About Stress  What is stress?     Stress is your body's response to a hard situation. Your body can have a physical, emotional, or mental response. Stress is a fact of life for most people, and it affects everyone differently. What causes stress for you may not be stressful for someone else.  A lot of things can cause stress. You may feel stress when you go on a job interview, take a test, or run a race. This kind of short-term stress is normal and even useful. It can help you if you need to work hard or react quickly. For example, stress can help you finish an important job on time.  Long-term stress is caused by ongoing stressful situations or events. Examples of long-term stress include long-term health problems, ongoing problems at work, or conflicts in your family. Long-term stress can harm your health.  How does stress affect your health?  When you are stressed, your body responds as though you are in danger. It makes hormones that speed up your heart, make you breathe faster, and give you a burst of energy. This is called the fight-or-flight stress response. If the stress is over quickly, your body goes back to normal and no harm is done.  But if stress happens too often or lasts too long, it can have bad effects. Long-term stress can make you more

## 2025-06-11 ENCOUNTER — PROCEDURE VISIT (OUTPATIENT)
Dept: FAMILY MEDICINE CLINIC | Age: 60
End: 2025-06-11

## 2025-06-11 ENCOUNTER — RESULTS FOLLOW-UP (OUTPATIENT)
Dept: FAMILY MEDICINE CLINIC | Age: 60
End: 2025-06-11

## 2025-06-11 VITALS
SYSTOLIC BLOOD PRESSURE: 124 MMHG | OXYGEN SATURATION: 97 % | DIASTOLIC BLOOD PRESSURE: 84 MMHG | HEART RATE: 120 BPM | BODY MASS INDEX: 28.02 KG/M2 | RESPIRATION RATE: 20 BRPM | WEIGHT: 187 LBS

## 2025-06-11 DIAGNOSIS — L91.8 CUTANEOUS SKIN TAGS: ICD-10-CM

## 2025-06-11 DIAGNOSIS — R52 PAIN, UNSPECIFIED: ICD-10-CM

## 2025-06-11 DIAGNOSIS — L91.8 SKIN TAG: Primary | ICD-10-CM

## 2025-06-11 NOTE — PROGRESS NOTES
Darling Carty (:  1965) is a 60 y.o. female,Established patient, here for evaluation of the following chief complaint(s):  Procedure           Assessment & Plan  Skin tag    Excision performed today    Orders:    REMOVAL OF SKIN TAGS    Pain, unspecified    See above    Orders:    REMOVAL OF SKIN TAGS    REMOVAL OF SKIN TAGS (50501)      Return if symptoms worsen or fail to improve.       Subjective   HPI  60-year-old presents for procedure she has a skin tag on her right leg that catches on her clothes causes discomfort occasional bleeding.    Prior to Visit Medications    Medication Sig Taking? Authorizing Provider   Tirzepatide (MOUNJARO) 10 MG/0.5ML SOAJ pen Inject 0.5 mLs into the skin once a week for 28 days Yes Ilia Wasserman MD   potassium chloride (KLOR-CON) 10 MEQ extended release tablet TAKE 3 TABLETS BY MOUTH 2 TIMES DAILY Yes Ilia Wasserman MD   FLUoxetine (PROZAC) 40 MG capsule TAKE 1 CAPSULE BY MOUTH 2 TIMES DAILY Yes Ilia Wasserman MD   blood glucose test strips (ASCENSIA AUTODISC VI;ONE TOUCH ULTRA TEST VI) strip 1 each by Does not apply route daily Yes Ilia Wasserman MD   atorvastatin (LIPITOR) 80 MG tablet TAKE 1 TABLET BY MOUTH EVERY DAY Yes Ilia Wasserman MD   rivaroxaban (XARELTO) 20 MG TABS tablet Take 1 tablet by mouth daily (with breakfast) Yes Ilia Wasserman MD   budesonide-formoterol (SYMBICORT) 160-4.5 MCG/ACT AERO Inhale 2 puffs into the lungs 2 times daily Yes Iila Wasserman MD   lisinopril (PRINIVIL;ZESTRIL) 40 MG tablet TAKE 1 TABLET BY MOUTH DAILY Yes Ilia Wasserman MD   triamcinolone (KENALOG) 0.025 % cream Apply topically 2 times daily. Yes Deo Polanco, ILENE - CNP   hydrOXYzine HCl (ATARAX) 25 MG tablet TAKE 1 TABLET BY MOUTH 2 TIMES DAILY AS NEEDED FOR ANXIETY Yes Deo Polanco APRN - CNP   blood glucose monitor strips 1 strip by Other route daily Yes Deo Polanco, ILENE - CNP   bisoprolol (ZEBETA) 5 MG tablet Take 1 tablet by mouth daily Yes Deo Polanco,

## 2025-06-17 ENCOUNTER — OFFICE VISIT (OUTPATIENT)
Dept: FAMILY MEDICINE CLINIC | Age: 60
End: 2025-06-17

## 2025-06-17 VITALS
BODY MASS INDEX: 28.17 KG/M2 | DIASTOLIC BLOOD PRESSURE: 80 MMHG | WEIGHT: 188 LBS | OXYGEN SATURATION: 98 % | HEART RATE: 110 BPM | RESPIRATION RATE: 20 BRPM | SYSTOLIC BLOOD PRESSURE: 124 MMHG

## 2025-06-17 DIAGNOSIS — L30.9 DERMATITIS: Primary | ICD-10-CM

## 2025-06-17 RX ORDER — METHYLPREDNISOLONE SODIUM SUCCINATE 40 MG/ML
40 INJECTION INTRAMUSCULAR; INTRAVENOUS ONCE
Status: COMPLETED | OUTPATIENT
Start: 2025-06-17 | End: 2025-06-17

## 2025-06-17 RX ORDER — TRIAMCINOLONE ACETONIDE 0.25 MG/G
CREAM TOPICAL
Qty: 15 G | Refills: 2 | Status: SHIPPED | OUTPATIENT
Start: 2025-06-17

## 2025-06-17 RX ADMIN — METHYLPREDNISOLONE SODIUM SUCCINATE 40 MG: 40 INJECTION INTRAMUSCULAR; INTRAVENOUS at 09:04

## 2025-06-17 NOTE — PROGRESS NOTES
Darling Carty (:  1965) is a 60 y.o. female,Established patient, here for evaluation of the following chief complaint(s):  Rash           Assessment & Plan  Dermatitis   Solumedrol 40 mg x1. Triamcinolone cream refilled.     Orders:    triamcinolone (KENALOG) 0.025 % cream; Apply topically 2 times daily.    methylPREDNISolone sodium succ (SOLU-MEDROL) injection 40 mg      Return if symptoms worsen or fail to improve.       Subjective   HPI  60 year old her for acute visit. C/o rash to check and back. Possibly associated with bird mites from nest near window.     Prior to Visit Medications    Medication Sig Taking? Authorizing Provider   triamcinolone (KENALOG) 0.025 % cream Apply topically 2 times daily. Yes Ilia Wasserman MD   Tirzepatide (MOUNJARO) 10 MG/0.5ML SOAJ pen Inject 0.5 mLs into the skin once a week for 28 days Yes Ilia Wasserman MD   potassium chloride (KLOR-CON) 10 MEQ extended release tablet TAKE 3 TABLETS BY MOUTH 2 TIMES DAILY Yes Ilia Wasserman MD   FLUoxetine (PROZAC) 40 MG capsule TAKE 1 CAPSULE BY MOUTH 2 TIMES DAILY Yes Ilia Wasserman MD   blood glucose test strips (ASCENSIA AUTODISC VI;ONE TOUCH ULTRA TEST VI) strip 1 each by Does not apply route daily Yes Ilia Wasserman MD   atorvastatin (LIPITOR) 80 MG tablet TAKE 1 TABLET BY MOUTH EVERY DAY Yes Ilia Wasserman MD   rivaroxaban (XARELTO) 20 MG TABS tablet Take 1 tablet by mouth daily (with breakfast) Yes Ilia Wasserman MD   budesonide-formoterol (SYMBICORT) 160-4.5 MCG/ACT AERO Inhale 2 puffs into the lungs 2 times daily Yes Ilia Wasserman MD   lisinopril (PRINIVIL;ZESTRIL) 40 MG tablet TAKE 1 TABLET BY MOUTH DAILY Yes Ilia Wasserman MD   hydrOXYzine HCl (ATARAX) 25 MG tablet TAKE 1 TABLET BY MOUTH 2 TIMES DAILY AS NEEDED FOR ANXIETY Yes Deo Polanco APRN - CNP   blood glucose monitor strips 1 strip by Other route daily Yes Deo Polacno APRN - CNP   bisoprolol (ZEBETA) 5 MG tablet Take 1 tablet by mouth daily Yes Deo Polanco

## 2025-07-07 DIAGNOSIS — I10 ESSENTIAL HYPERTENSION: ICD-10-CM

## 2025-07-07 RX ORDER — LISINOPRIL 40 MG/1
40 TABLET ORAL DAILY
Qty: 90 TABLET | Refills: 3 | Status: SHIPPED | OUTPATIENT
Start: 2025-07-07

## 2025-08-07 ENCOUNTER — TELEPHONE (OUTPATIENT)
Dept: ADMINISTRATIVE | Age: 60
End: 2025-08-07

## 2025-08-07 DIAGNOSIS — E11.9 TYPE 2 DIABETES MELLITUS WITHOUT COMPLICATION, WITHOUT LONG-TERM CURRENT USE OF INSULIN (HCC): ICD-10-CM

## 2025-08-13 ENCOUNTER — OFFICE VISIT (OUTPATIENT)
Dept: FAMILY MEDICINE CLINIC | Age: 60
End: 2025-08-13
Payer: MEDICARE

## 2025-08-13 VITALS
SYSTOLIC BLOOD PRESSURE: 122 MMHG | BODY MASS INDEX: 28.32 KG/M2 | DIASTOLIC BLOOD PRESSURE: 80 MMHG | WEIGHT: 189 LBS | RESPIRATION RATE: 20 BRPM | OXYGEN SATURATION: 98 % | HEART RATE: 82 BPM

## 2025-08-13 DIAGNOSIS — M25.811 IMPINGEMENT OF RIGHT SHOULDER: Primary | ICD-10-CM

## 2025-08-13 DIAGNOSIS — E11.69 TYPE 2 DIABETES MELLITUS WITH OBESITY (HCC): ICD-10-CM

## 2025-08-13 DIAGNOSIS — I48.91 ATRIAL FIBRILLATION, UNSPECIFIED TYPE (HCC): ICD-10-CM

## 2025-08-13 DIAGNOSIS — E66.9 TYPE 2 DIABETES MELLITUS WITH OBESITY (HCC): ICD-10-CM

## 2025-08-13 DIAGNOSIS — M25.511 ACUTE PAIN OF RIGHT SHOULDER: ICD-10-CM

## 2025-08-13 PROCEDURE — 3074F SYST BP LT 130 MM HG: CPT | Performed by: FAMILY MEDICINE

## 2025-08-13 PROCEDURE — 20610 DRAIN/INJ JOINT/BURSA W/O US: CPT | Performed by: FAMILY MEDICINE

## 2025-08-13 PROCEDURE — 99214 OFFICE O/P EST MOD 30 MIN: CPT | Performed by: FAMILY MEDICINE

## 2025-08-13 PROCEDURE — 3044F HG A1C LEVEL LT 7.0%: CPT | Performed by: FAMILY MEDICINE

## 2025-08-13 PROCEDURE — 3079F DIAST BP 80-89 MM HG: CPT | Performed by: FAMILY MEDICINE

## 2025-08-13 RX ORDER — METHYLPREDNISOLONE ACETATE 40 MG/ML
80 INJECTION, SUSPENSION INTRA-ARTICULAR; INTRALESIONAL; INTRAMUSCULAR; SOFT TISSUE ONCE
Status: COMPLETED | OUTPATIENT
Start: 2025-08-13 | End: 2025-08-13

## 2025-08-13 RX ADMIN — METHYLPREDNISOLONE ACETATE 80 MG: 40 INJECTION, SUSPENSION INTRA-ARTICULAR; INTRALESIONAL; INTRAMUSCULAR; SOFT TISSUE at 10:27

## 2025-08-13 ASSESSMENT — ENCOUNTER SYMPTOMS
DIARRHEA: 0
CONSTIPATION: 0
VOMITING: 0
NAUSEA: 0
SHORTNESS OF BREATH: 0